# Patient Record
Sex: MALE | Race: WHITE | NOT HISPANIC OR LATINO | Employment: FULL TIME | URBAN - METROPOLITAN AREA
[De-identification: names, ages, dates, MRNs, and addresses within clinical notes are randomized per-mention and may not be internally consistent; named-entity substitution may affect disease eponyms.]

---

## 2017-01-30 ENCOUNTER — TRANSFERRED RECORDS (OUTPATIENT)
Dept: HEALTH INFORMATION MANAGEMENT | Facility: CLINIC | Age: 57
End: 2017-01-30

## 2017-03-23 ENCOUNTER — OFFICE VISIT (OUTPATIENT)
Dept: DERMATOLOGY | Facility: CLINIC | Age: 57
End: 2017-03-23

## 2017-03-23 DIAGNOSIS — L82.1 SEBORRHEIC KERATOSIS: Primary | ICD-10-CM

## 2017-03-23 PROCEDURE — 99212 OFFICE O/P EST SF 10 MIN: CPT | Performed by: DERMATOLOGY

## 2017-03-23 NOTE — LETTER
3/23/2017       RE: Deacon Torres  [address]  [place] [address]     Dear Colleague,    Thank you for referring your patient, Deacon Torres, to the [facility] at [facility]. Please see a copy of my visit note below.    [facility] Dermatology Note      Dermatology Problem List:  1. Compound nevus with moderate dysplasia, right lateral ankle  -s/p biopsy 7/18/2016  2. Actinic keratosis  -s/p cryotherapy    Encounter Date: Mar 23, 2017    CC:  Chief Complaint   Patient presents with     RECHECK     Skin lesion         History of Present Illness:  Mr. Deacon Torres is a 56 year old male who presents as follow up for lesion on Scalp. The patient was last seen 10/18/2016 when AKs were treated with cryotherapy. Today, the pt reports about 2 months ago, he developed a new lesion that does not burn, itch, or spontaneously bleed.Lesion has not been growing in size but it is noticeable. No other skin concerns at this time.     Past Medical History:   There is no problem list on file for this patient.    Past Medical History:   Diagnosis Date     Carpal tunnel syndrome      Depression      No past surgical history on file.    Social History:  Reviewed and unchanged but kept in chart for clinician convenience  The patient works as a paramedic. Worked construction prior. 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 The patient has 1-2 alcoholic drinks per week, does not smoke or use tobacco.     Family History:  Reviewed and unchanged but kept in chart for clinician convenience  There is no family history of skin cancer.    Medications:  Current Outpatient Prescriptions   Medication Sig Dispense Refill     SERTRALINE HCL PO Take 100 mg by mouth daily         Allergies   Allergen Reactions     Codeine Itching       Review of Systems:  -Skin: As above in HPI. No additional skin concerns.    Physical exam:  Vitals: There were no vitals taken for  this visit.  GEN: This is a well developed, well-nourished male in no acute distress, in a pleasant mood.    SKIN:  Focused examination of the Head/Scalp was performed.  -There is a waxy stuck on tan to brown papule on the scalp.  -No other lesions of concern on areas examined.     Impression/Plan:  1. Seborrheic keratosis: Benign nature discussed. Explained that treatment covered by insurance if inflamed, itchy, oozy, traumatized, or peeling off. Otherwise treatment can be done with a cosmetic out of pocket fee. Pt elected to not treat for now.    Follow up as scheduled with Dr. Vogt in May 2017, earlier for new or changing lesions.     Staff Involved:  Scribe/Staff      Scribe Disclosure:   I, Alex Ash, am serving as a scribe to document services personally performed by Dr. Travis Alvarenga, based on data collection and the provider's statements to me.     Provider Disclosure:   I have reviewed the documentation recorded by the scribe and have edited it as needed. I have personally performed the services documented here and the documentation accurately represents those services and the decisions made by me.     Travis Alvarenga MD, MS    Department of Dermatology  [facility]: Phone: [phone], Fax:[phone]  [facility]: Phone: [phone], Fax: [phone]

## 2017-03-23 NOTE — PROGRESS NOTES
[facility] Dermatology Note      Dermatology Problem List:  1. Compound nevus with moderate dysplasia, right lateral ankle  -s/p biopsy 7/18/2016  2. Actinic keratosis  -s/p cryotherapy    Encounter Date: Mar 23, 2017    CC:  Chief Complaint   Patient presents with     RECHECK     Skin lesion         History of Present Illness:  Mr. Deacon Torres is a 56 year old male who presents as follow up for lesion on Scalp. The patient was last seen 10/18/2016 when AKs were treated with cryotherapy. Today, the pt reports about 2 months ago, he developed a new lesion that does not burn, itch, or spontaneously bleed.Lesion has not been growing in size but it is noticeable. No other skin concerns at this time.     Past Medical History:   There is no problem list on file for this patient.    Past Medical History:   Diagnosis Date     Carpal tunnel syndrome      Depression      No past surgical history on file.    Social History:  Reviewed and unchanged but kept in chart for clinician convenience  The patient works as a paramedic. Worked construction prior. 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 The patient has 1-2 alcoholic drinks per week, does not smoke or use tobacco.     Family History:  Reviewed and unchanged but kept in chart for clinician convenience  There is no family history of skin cancer.    Medications:  Current Outpatient Prescriptions   Medication Sig Dispense Refill     SERTRALINE HCL PO Take 100 mg by mouth daily         Allergies   Allergen Reactions     Codeine Itching       Review of Systems:  -Skin: As above in HPI. No additional skin concerns.    Physical exam:  Vitals: There were no vitals taken for this visit.  GEN: This is a well developed, well-nourished male in no acute distress, in a pleasant mood.    SKIN:  Focused examination of the Head/Scalp was performed.  -There is a waxy stuck on tan to brown papule on the scalp.  -No other lesions of concern on areas examined.      Impression/Plan:  1. Seborrheic keratosis: Benign nature discussed. Explained that treatment covered by insurance if inflamed, itchy, oozy, traumatized, or peeling off. Otherwise treatment can be done with a cosmetic out of pocket fee. Pt elected to not treat for now.    Follow up as scheduled with Dr. Vogt in May 2017, earlier for new or changing lesions.     Staff Involved:  Scribe/Staff      Scribe Disclosure:   I, Alex Ash, am serving as a scribe to document services personally performed by Dr. Travis Alvarenga, based on data collection and the provider's statements to me.     Provider Disclosure:   I have reviewed the documentation recorded by the scribe and have edited it as needed. I have personally performed the services documented here and the documentation accurately represents those services and the decisions made by me.     Travis Alvarenga MD, MS    Department of Dermatology  [facility]: Phone: [phone], Fax:[phone]  [facility]: Phone: [phone], Fax: [phone]

## 2017-03-23 NOTE — MR AVS SNAPSHOT
After Visit Summary   3/23/2017    Deacon Torres    MRN: [id number]           Patient Information     Date Of Birth          1960        Visit Information        Provider Department      3/23/2017 8:15 AM Travis Alvarenga MD [facility]        Today's Diagnoses     Seborrheic keratosis    -  1       Follow-ups after your visit        Your next 10 appointments already scheduled     May 05, 2017  8:30 AM CDT   Return Visit with Barbara Vogt MD   [facility] ([facility])    [address] [place] [address] [phone]              Who to contact     If you have questions or need follow up information about today's clinic visit or your schedule please contact [facility] directly at [phone].  Normal or non-critical lab and imaging results will be communicated to you by MyChart, letter or phone within 4 business days after the clinic has received the results. If you do not hear from us within 7 days, please contact the clinic through MyChart or phone. If you have a critical or abnormal lab result, we will notify you by phone as soon as possible.  Submit refill requests through [employer] or call your pharmacy and they will forward the refill request to us. Please allow 3 business days for your refill to be completed.          Additional Information About Your Visit        MyChart Information     [employer] is an electronic gateway that provides easy, online access to your medical records. With [employer], you can request a clinic appointment, read your test results, renew a prescription or communicate with your care team.     To sign up for [employer] visit the website at [email or web address]   You will be asked to enter the access code listed below, as well as some personal information. Please follow the directions to create your username and password.     Your access code is: [email or web address]  Expires: 6/21/2017   8:30 AM     Your access code will  in 90 days. If you need help or a new code, please contact your [facility] or call [phone] for assistance.        Care EveryWhere ID     This is your Care EveryWhere ID. This could be used by other organizations to access your [place] medical records  [id number]         Blood Pressure from Last 3 Encounters:   No data found for BP    Weight from Last 3 Encounters:   No data found for Wt              Today, you had the following     No orders found for display       Primary Care Provider Office Phone # Fax #    Alexi Watts [phone]       [facility] [address] [address]        Thank you!     Thank you for choosing [facility]  for your care. Our goal is always to provide you with excellent care. Hearing back from our patients is one way we can continue to improve our services. Please take a few minutes to complete the written survey that you may receive in the mail after your visit with us. Thank you!             Your Updated Medication List - Protect others around you: Learn how to safely use, store and throw away your medicines at www.disposemymeds.org.          This list is accurate as of: 3/23/17  8:30 AM.  Always use your most recent med list.                   Brand Name Dispense Instructions for use    SERTRALINE HCL PO      Take 100 mg by mouth daily

## 2017-03-23 NOTE — NURSING NOTE
Dermatology Rooming Note    Deacon Torres's goals for this visit include:   Chief Complaint   Patient presents with     RECHECK     Skin lesion       Is a scribe okay for this visit:YES,     Are records needed for this visit(If yes, obtain release of information): Not applicable,      Vitals: There were no vitals taken for this visit.    Referring Provider:  No referring provider defined for this encounter.        Medication Refills: none      Shea Lozano CMA

## 2017-05-05 ENCOUNTER — OFFICE VISIT (OUTPATIENT)
Dept: DERMATOLOGY | Facility: CLINIC | Age: 57
End: 2017-05-05
Payer: COMMERCIAL

## 2017-05-05 DIAGNOSIS — L57.0 ACTINIC KERATOSIS: ICD-10-CM

## 2017-05-05 DIAGNOSIS — D48.5 NEOPLASM OF UNCERTAIN BEHAVIOR OF SKIN: ICD-10-CM

## 2017-05-05 DIAGNOSIS — Z86.018 HISTORY OF DYSPLASTIC NEVUS: Primary | ICD-10-CM

## 2017-05-05 PROCEDURE — 11101 HC DESTRUCT PREMALIGNANT LESION, FIRST: CPT | Performed by: DERMATOLOGY

## 2017-05-05 PROCEDURE — 11100 HC BIOPSY SKIN/SUBQ/MUC MEM, SINGLE LESION: CPT | Mod: 59 | Performed by: DERMATOLOGY

## 2017-05-05 PROCEDURE — 99213 OFFICE O/P EST LOW 20 MIN: CPT | Mod: 25 | Performed by: DERMATOLOGY

## 2017-05-05 PROCEDURE — 17003 DESTRUCT PREMALG LES 2-14: CPT | Performed by: DERMATOLOGY

## 2017-05-05 PROCEDURE — 17000 DESTRUCT PREMALG LESION: CPT | Performed by: DERMATOLOGY

## 2017-05-05 PROCEDURE — 88305 TISSUE EXAM BY PATHOLOGIST: CPT | Performed by: DERMATOLOGY

## 2017-05-05 NOTE — MR AVS SNAPSHOT
After Visit Summary   5/5/2017    Deacon Torres    MRN: [id number]           Patient Information     Date Of Birth          1960        Visit Information        Provider Department      5/5/2017 8:30 AM Barbara Vogt MD [facility]        Today's Diagnoses     History of dysplastic nevus    -  1    Neoplasm of uncertain behavior of skin        Actinic keratosis          Care Instructions    Cryotherapy    What is it?    Use of a very cold liquid, such as liquid nitrogen, to freeze and destroy abnormal skin cells that need to be removed    What should I expect?    Tenderness and redness    A small blister that might grow and fill with dark purple blood. There may be crusting.    More than one treatment may be needed if the lesions do not go away.    How do I care for the treated area?    Gently wash the area with your hands when bathing.    Use a thin layer of Vaseline to help with healing. You may use a Band-Aid.     The area should heal within 7-10 days and may leave behind a pink or lighter color.     Do not use an antibiotic or Neosporin ointment.     You may take acetaminophen (Tylenol) for pain.     Call your Doctor if you have:    Severe pain    Signs of infection (warmth, redness, cloudy yellow drainage, and or a bad smell)    Questions or concerns    Who should I call with questions?       [facility]: [phone]       [facility]: [phone]       For urgent needs outside of business hours call the [facility] at [phone]        and ask for the dermatology resident on call    Wound Care After a Biopsy    What is a skin biopsy?  A skin biopsy allows the doctor to examine a very small piece of tissue under the microscope to determine the diagnosis and the best treatment for the skin condition. A local anesthetic (numbing medicine)  is injected with a very small needle into the skin area to be  tested. A small piece of skin is taken from the area. Sometimes a suture (stitch) is used.     What are the risks of a skin biopsy?  I will experience scar, bleeding, swelling, pain, crusting and redness. I may experience incomplete removal or recurrence. Risks of this procedure are excessive bleeding, bruising, infection, nerve damage, numbness, thick (hypertrophic or keloidal) scar and non-diagnostic biopsy.    How should I care for my wound for the first 24 hours?    Keep the wound dry and covered for 24 hours    If it bleeds, hold direct pressure on the area for 15 minutes. If bleeding does not stop then go to the emergency room    Avoid strenuous exercise the first 1-2 days or as your doctor instructs you    How should I care for the wound after 24 hours?    After 24 hours, remove the bandage    You may bathe or shower as normal    If you had a scalp biopsy, you can shampoo as usual and can use shower water to clean the biopsy site daily    Clean the wound twice a day with gentle soap and water    Do not scrub, be gentle    Apply white petroleum/Vaseline after cleaning the wound with a cotton swab or a clean finger, and keep the site covered with a Bandaid /bandage. Bandages are not necessary with a scalp biopsy    If you are unable to cover the site with a Bandaid /bandage, re-apply ointment 2-3 times a day to keep the site moist. Moisture will help with healing    Avoid strenuous activity for first 1-2 days    Avoid lakes, rivers, pools, and oceans until the stitches are removed or the site is healed    How do I clean my wound?    Wash hands for 15 minutes with soap or use hand  before all wound care    Clean the wound with gentle soap and water    Apply white petroleum/Vaseline  to wound after it is clean    Replace the Bandaid /bandage to keep the wound covered for the first few days or as instructed by your doctor    If you had a scalp biopsy, warm shower water to the area on a daily basis should  suffice    What should I use to clean my wound?     Cotton-tipped applicators (Qtips )    White petroleum jelly (Vaseline ). Use a clean new container and use Q-tips to apply.    Bandaids   as needed    Gentle soap     How should I care for my wound long term?    Do not get your wound dirty    Keep up with wound care for one week or until the area is healed.    A small scab will form and fall off by itself when the area is completely healed. The area will be red and will become pink in color as it heals. Sun protection is very important for how your scar will turn out. Sunscreen with an SPF 30 or greater is recommended once the area is healed.    If you have stitches, stitches need to be removed in 5-7 days. You may return to our clinic for this or you may have it done locally at your doctor s office.    You should have some soreness but it should be mild and slowly go away over several days. Talk to your doctor about using tylenol for pain,    When should I call my doctor?  If you have increased:     Pain or swelling    Pus or drainage (clear or slightly yellow drainage is ok)    Temperature over 100F    Spreading redness or warmth around wound    When will I hear about my results?  The biopsy results can take 2-3 weeks to come back. The clinic will call you with the results, send you a Enlightedt message, or have you schedule a follow-up clinic or phone time to discuss the results. Contact our clinics if you do not hear from us in 3 weeks.     Who should I call with questions?    [facility]: [phone]     [facility]: [phone]    For urgent needs outside of business hours call the [facility] at [phone] and ask for the dermatology resident on call        [place] Locations for Suture Removal    Refer to your after visit summary for reference on when to have sutures removed.   You may have sutures removed at this clinic or most convenient  location to schedule nursing visit for removal.     [place]   [address]  [place] [address]  [phone]   [place]  [address]  [place] [address]  [phone]     [place]  [address]  [place] [address]  [phone] [place]  [address]  [place] [address]  [phone]   [place]   [address]  [place] [address]  [phone]   [place]  [address] [place] [address]  [phone]     [place]  [address]  [place] [address]  [phone]   [place]  [address]  [facility] [address]  [phone]   [place]  [address]  [facility] [address]  [phone]   [place]   [address]  [place] [address]  [phone]     [place]  [address]  [facility] [address]  [phone] [place]  [address]  [place] [address]  [phone]   [place]  [address]  [place] [address]  [phone]   [place]  [address]  [place] [address]  [phone]     [place]  [address]  [facility] [address]  [phone]   [place]  [address]  [place] [address]  [phone]   [facility]  [address] [facility] [address]  [phone]     [place]  [address]  [place] [address] [phone] [place], [facility]  [address]  [facility] [address]  [phone] [place]  [address]  [place] [address] [phone]   [facility]  [address]  [facility] [address]  [phone]     [place]  [address] [address]  [phone] [place], [facility]?  [address] [place] [address]  [phone]   [place]  [address] [place] [address]  [phone]   [place] [address] [place] [address]  [phone]   [place]  [address] [place] [address]  [phone]     [address] [address]  [phone]  [place]  [address]  [place] [address]  [phone]   [place]   [address]  [facility] [address]  [phone]     [place]  [address] [place] [address]  [phone]   [place]  [address] [facility] [address]  [phone]   [place]  [address] [place] [address]  [phone]   [place]  [address]  [facility] [address]  [phone]     [place]  [address]  [facility] [address]  [phone]   [place]  [address]  hospitals [address]  [phone]    [place]  [address]  [facility] [address]  [phone]   [place]  [address]  [facility] [address]  [phone]   [address] [place] [address]  [phone]                Follow-ups after your visit        Who to contact     If you have questions or need follow up information about today's clinic visit or your schedule please contact [facility] directly at [phone].  Normal or non-critical lab and imaging results will be communicated to you by [employer], letter or phone within 4 business days after the clinic has received the results. If you do not hear from us within 7 days, please contact the clinic through [employer] or phone. If you have a critical or abnormal lab result, we will notify you by phone as soon as possible.  Submit refill requests through [employer] or call your pharmacy and they will forward the refill request to us. Please allow 3 business days for your refill to be completed.          Additional Information About Your Visit        [employer] Information     [employer] is an electronic gateway that provides easy, online access to your medical records. With [employer], you can request a clinic appointment, read your test results, renew a prescription or communicate with your care team.     To sign up for [employer] visit the website at [email or web address]   You will be asked to enter the access code listed below,  as well as some personal information. Please follow the directions to create your username and password.     Your access code is: [email or web address]  Expires: 2017  8:30 AM     Your access code will  in 90 days. If you need help or a new code, please contact your [facility] or call [phone] for assistance.        Care EveryWhere ID     This is your Care EveryWhere ID. This could be used by other organizations to access your [place] medical records  [id number]         Blood Pressure from Last 3 Encounters:   No data found for BP    Weight from Last 3 Encounters:   No data found for Wt              We Performed the Following     BIOPSY SKIN/SUBQ/MUC MEM, EACH ADDTL LESION     BIOPSY SKIN/SUBQ/MUC MEM, SINGLE LESION     DESTRUCT PREMALIGNANT LESION, 2-14     DESTRUCT PREMALIGNANT LESION, FIRST     Surgical pathology exam        Primary Care Provider Office Phone # Fax #    Alexi Watts [phone]       [address] [address]        Thank you!     Thank you for choosing [facility]  for your care. Our goal is always to provide you with excellent care. Hearing back from our patients is one way we can continue to improve our services. Please take a few minutes to complete the written survey that you may receive in the mail after your visit with us. Thank you!             Your Updated Medication List - Protect others around you: Learn how to safely use, store and throw away your medicines at www.disposemymeds.org.          This list is accurate as of: 17  9:13 AM.  Always use your most recent med list.                   Brand Name Dispense Instructions for use    SERTRALINE HCL PO      Take 100 mg by mouth daily

## 2017-05-05 NOTE — NURSING NOTE
Dermatology Rooming Note    Deacon Torres's goals for this visit include:   Chief Complaint   Patient presents with     Derm Problem     6 month skin check hx of AK       Is a scribe okay for this visit:YES    Are records needed for this visit(If yes, obtain release of information): Not applicable     Vitals: There were no vitals taken for this visit.    Referring Provider:  No referring provider defined for this encounter.

## 2017-05-05 NOTE — LETTER
5/5/2017       RE: Deacon Torres  [address]  [facility] [address]     Dear Colleague,    Thank you for referring your patient, Deacon Torres, to the [facility] at [facility]. Please see a copy of my visit note below.    [facility] Dermatology Note      Dermatology Problem List:  1. Compound nevus with moderate dysplasia, right lateral ankle  -s/p biopsy 7/18/2016  2. Actinic keratosis  -s/p cryotherapy    Encounter Date: May 5, 2017    CC:  Chief Complaint   Patient presents with     Derm Problem     6 month skin check hx of AK         History of Present Illness:  Mr. Deacon Torres is a 56 year old male who presents as follow up for history of dysplastic nevus and AK. The patient was last seen 3/23/2017 by Dr. Alvarenga when SKs were identified, patient declined treatment. Today, the patient reports that he has had the lesion on his right forehead for many years. Denies any lesions bleeding, crusting or changing. Scalp spot has decreased in size. The patient reports no other lesions of concern.      Past Medical History:   There is no problem list on file for this patient.    Past Medical History:   Diagnosis Date     Carpal tunnel syndrome      Depression      No past surgical history on file.    Social History:  Reviewed and unchanged but kept in chart for clinician convenience  The patient works as a paramedic. Worked construction prior. 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 The patient has 1-2 alcoholic drinks per week, does not smoke or use tobacco.     Family History:  Reviewed and unchanged but kept in chart for clinician convenience  There is no family history of skin cancer.    Medications:  Current Outpatient Prescriptions   Medication Sig Dispense Refill     SERTRALINE HCL PO Take 100 mg by mouth daily       Allergies   Allergen Reactions     Codeine Itching     Review of Systems:  -Const: Denies fevers or chills. Admits  to night sweats, he has followed with his PCP for this symptom.   -Skin: As above in HPI. No additional skin concerns.    Physical exam:  Vitals: There were no vitals taken for this visit.  GEN: This is a well developed, well-nourished male in no acute distress, in a pleasant mood.    SKIN: Total skin excluding the undergarment areas was performed. The exam included the head/face, neck, both arms, chest, back, abdomen, both legs, digits and/or nails. Declines genital exam.   -There is a well healed surgical scar without erythema, nodularity or telangiectasias on the right lateral ankle.   -There are erythematous macules with overyling adherent scale on the right cheek, right temple and left lateral brow.   -There is a waxy stuck on tan to brown papule on the left vertex scalp with erythema.  -Back is normal.   -Tan 2cm patch on the left cheek.   -5mm verrucous stuck on papule, left vertex scalp  -No other lesions of concern on areas examined.     Impression/Plan:  1. History of dysplastic nevus, no clinical evidence of recurrence    ABCDs of melanoma were discussed and self skin checks were advised.   2. Actinic keratosis, right cheek, right temple, left lateral brow    Cryotherapy procedure note: After verbal consent and discussion of risks and benefits including but no limited to dyspigmentation/scar, blister, and pain, 3 were treated with 1-2mm freeze border for 1 cycle with liquid nitrogen. Post cryotherapy instructions were provided.    Sun precaution was advised including the use of sun screens of SPF 30 or higher, sun protective clothing, and avoidance of tanning beds.  3. 5mm verrucous papule, left vertex scalp. Neoplasm of uncertain behavior. Differential diagnosis includes SK versus other    Shave biopsy:  After discussion of benefits and risks including but not limited to bleeding/bruising, pain/swelling, infection, scar, incomplete removal, nerve damage/numbness, recurrence, and non-diagnostic biopsy,  written consent, verbal consent and photographs were obtained. Time-out was performed. The area was cleaned with isopropyl alcohol. <.5mL of 1% lidocaine with epinephrine was injected to obtain adequate anesthesia of the lesion on the left vertex scalp. A shave biopsy was performed. Hemostasis was achieved with aluminium chloride. Vaseline and a sterile dressing were applied. The patient tolerated the procedure and no complications were noted. The patient was provided with verbal and written post care instructions.   4. Tan 2cm patch, left cheek. Neoplasm of uncertain behavior. Differential diagnosis includes lentigo versus other, changed compared to prior photo    Punch biopsy:  After discussion of benefits and risks including but not limited to bleeding/bruising, pain/swelling, infection, scar, incomplete removal, nerve damage/numbness, recurrence, and non-diagnostic biopsy, written consent, verbal consent and photographs were obtained. Time-out was performed. The area was cleaned with isopropyl alcohol. 0.5 mL of 1% lidocaine with epinephrine was injected to obtain adequate anesthesia of the lesion on the left cheek. A 2 mm punch biopsy was performed.  6-0 prolene sutures were utilized to approximate the epidermal edges.  White petroleum jelly/VaselineTM and a bandage was applied to the wound.  Explicit verbal and written wound care instructions were provided.  The patient left the Dermatology Clinic in good condition. The patient was counseled to follow up for suture removal in approximately 5-7 days.    Follow up in 1 year, earlier for new or changing lesions.     Staff Involved:  Scribe/Staff    Scribe Disclosure:   I, Saima Mead, am serving as a scribe to document services personally performed by Dr. Barbara Vogt, based on data collection and the provider's statements to me.     Provider Disclosure:   I agree with above History, Review of Systems, Physical exam and Plan. I have reviewed the content of the  documentation and have edited it as needed. I have personally performed the services documented here and the documentation accurately represents those services and the decisions I have made.     Barbara Vogt MD    Department of Dermatology  [facility]: Phone: [phone], Fax:[phone]  [facility]: Phone: [phone], Fax: [phone]

## 2017-05-05 NOTE — PATIENT INSTRUCTIONS
Cryotherapy    What is it?    Use of a very cold liquid, such as liquid nitrogen, to freeze and destroy abnormal skin cells that need to be removed    What should I expect?    Tenderness and redness    A small blister that might grow and fill with dark purple blood. There may be crusting.    More than one treatment may be needed if the lesions do not go away.    How do I care for the treated area?    Gently wash the area with your hands when bathing.    Use a thin layer of Vaseline to help with healing. You may use a Band-Aid.     The area should heal within 7-10 days and may leave behind a pink or lighter color.     Do not use an antibiotic or Neosporin ointment.     You may take acetaminophen (Tylenol) for pain.     Call your Doctor if you have:    Severe pain    Signs of infection (warmth, redness, cloudy yellow drainage, and or a bad smell)    Questions or concerns    Who should I call with questions?       [facility]: [phone]       [facility]: [phone]       For urgent needs outside of business hours call the [facility] at [phone]        and ask for the dermatology resident on call    Wound Care After a Biopsy    What is a skin biopsy?  A skin biopsy allows the doctor to examine a very small piece of tissue under the microscope to determine the diagnosis and the best treatment for the skin condition. A local anesthetic (numbing medicine)  is injected with a very small needle into the skin area to be tested. A small piece of skin is taken from the area. Sometimes a suture (stitch) is used.     What are the risks of a skin biopsy?  I will experience scar, bleeding, swelling, pain, crusting and redness. I may experience incomplete removal or recurrence. Risks of this procedure are excessive bleeding, bruising, infection, nerve damage, numbness, thick (hypertrophic or keloidal) scar and non-diagnostic biopsy.    How should I care  for my wound for the first 24 hours?    Keep the wound dry and covered for 24 hours    If it bleeds, hold direct pressure on the area for 15 minutes. If bleeding does not stop then go to the emergency room    Avoid strenuous exercise the first 1-2 days or as your doctor instructs you    How should I care for the wound after 24 hours?    After 24 hours, remove the bandage    You may bathe or shower as normal    If you had a scalp biopsy, you can shampoo as usual and can use shower water to clean the biopsy site daily    Clean the wound twice a day with gentle soap and water    Do not scrub, be gentle    Apply white petroleum/Vaseline after cleaning the wound with a cotton swab or a clean finger, and keep the site covered with a Bandaid /bandage. Bandages are not necessary with a scalp biopsy    If you are unable to cover the site with a Bandaid /bandage, re-apply ointment 2-3 times a day to keep the site moist. Moisture will help with healing    Avoid strenuous activity for first 1-2 days    Avoid lakes, rivers, pools, and oceans until the stitches are removed or the site is healed    How do I clean my wound?    Wash hands for 15 minutes with soap or use hand  before all wound care    Clean the wound with gentle soap and water    Apply white petroleum/Vaseline  to wound after it is clean    Replace the Bandaid /bandage to keep the wound covered for the first few days or as instructed by your doctor    If you had a scalp biopsy, warm shower water to the area on a daily basis should suffice    What should I use to clean my wound?     Cotton-tipped applicators (Qtips )    White petroleum jelly (Vaseline ). Use a clean new container and use Q-tips to apply.    Bandaids   as needed    Gentle soap     How should I care for my wound long term?    Do not get your wound dirty    Keep up with wound care for one week or until the area is healed.    A small scab will form and fall off by itself when the area is  completely healed. The area will be red and will become pink in color as it heals. Sun protection is very important for how your scar will turn out. Sunscreen with an SPF 30 or greater is recommended once the area is healed.    If you have stitches, stitches need to be removed in 5-7 days. You may return to our clinic for this or you may have it done locally at your doctor s office.    You should have some soreness but it should be mild and slowly go away over several days. Talk to your doctor about using tylenol for pain,    When should I call my doctor?  If you have increased:     Pain or swelling    Pus or drainage (clear or slightly yellow drainage is ok)    Temperature over 100F    Spreading redness or warmth around wound    When will I hear about my results?  The biopsy results can take 2-3 weeks to come back. The clinic will call you with the results, send you a [employer] message, or have you schedule a follow-up clinic or phone time to discuss the results. Contact our clinics if you do not hear from us in 3 weeks.     Who should I call with questions?    [facility]: [phone]     [facility]: [phone]    For urgent needs outside of business hours call the [facility] at [phone] and ask for the dermatology resident on call        [place] Locations for Suture Removal    Refer to your after visit summary for reference on when to have sutures removed.   You may have sutures removed at this clinic or most convenient location to schedule nursing visit for removal.     [place]   [address] [facility] [id number] [phone]   [place]  [address]  [facility] [address]  [phone]     [place]  [address] [facility] [address]  [phone] [place]  [address] [facility] [address] [phone]   [place]   [address] [facility] [id number] [phone]   [place]  [address] [place] [address]  [phone]     [place]  [address]  [place] [address]  [phone]   [place]  [address]  [facility] [address]  [phone]   [place]  [address]  [place] [address]  [phone]   [place]   [address]  [place] [address]  [phone]     [place]  [address]  [place] [address]  [phone] [place]  [address]  [place] [address]  [phone]   [place]  [address]  [place] [address]  [phone]   [place]  [address]  [place] [address]  [phone]     [facility]  [address]  [facility] [address]  [phone]   [place]  [address]  [place] [address]  [phone]   [facility]  [address]  [facility] [address]  [phone]     [place]  [address]  [place] [address]  [phone] [facility]  [address]  [facility] [address]  [phone] [place]  [address]  [facility] [address]  [phone]   [facility]  [address]  [facility] [address]  [phone]     [place]  [address]  [facility] [address]  [phone] [place], [place]?  [address] [facility] [place] [id number] [phone]   [place]  [address]  [place] [address]  [phone]   [place]  [address]  [facility] [address]  [phone]   [place]  [address]  [place] [address]  [phone]     [place]  [address]  [facility] [address]  [phone] [place]  [address]  [place] [address]  [phone]   [place]   [address]  [facility] [address]  [phone]     [place]  [address] [place] [address]  [phone]   [place]  [address] [place] [address]  [phone]   [place]  [address] [place] [address]  [phone]   [place] [address]  [facility] [address]  [phone]     [place]  [address]  [facility] [address]  [phone]   [place]  [address]  [facility] [address]  [phone]    [place]  [address]  [facility] [address]  [phone]   [place]  [address]  [facility] [address]  [phone]   [address]   [facility] [address] [phone]

## 2017-05-10 LAB — COPATH REPORT: NORMAL

## 2017-06-15 ENCOUNTER — TRANSFERRED RECORDS (OUTPATIENT)
Dept: HEALTH INFORMATION MANAGEMENT | Facility: CLINIC | Age: 57
End: 2017-06-15

## 2017-08-14 ENCOUNTER — OFFICE VISIT (OUTPATIENT)
Dept: DERMATOLOGY | Facility: CLINIC | Age: 57
End: 2017-08-14
Payer: COMMERCIAL

## 2017-08-14 DIAGNOSIS — L57.0 ACTINIC KERATOSIS: Primary | ICD-10-CM

## 2017-08-14 DIAGNOSIS — L21.9 SEBORRHEIC DERMATITIS: ICD-10-CM

## 2017-08-14 DIAGNOSIS — B07.9 FILIFORM WART: ICD-10-CM

## 2017-08-14 PROCEDURE — 99213 OFFICE O/P EST LOW 20 MIN: CPT | Mod: 25 | Performed by: DERMATOLOGY

## 2017-08-14 PROCEDURE — 17110 DESTRUCTION B9 LES UP TO 14: CPT | Performed by: DERMATOLOGY

## 2017-08-14 PROCEDURE — 17000 DESTRUCT PREMALG LESION: CPT | Mod: 59 | Performed by: DERMATOLOGY

## 2017-08-14 PROCEDURE — 17003 DESTRUCT PREMALG LES 2-14: CPT | Performed by: DERMATOLOGY

## 2017-08-14 RX ORDER — HYDROCORTISONE 2.5 %
CREAM (GRAM) TOPICAL
Qty: 30 G | Refills: 3 | Status: SHIPPED | OUTPATIENT
Start: 2017-08-14 | End: 2021-04-09

## 2017-08-14 RX ORDER — KETOCONAZOLE 20 MG/G
CREAM TOPICAL
Qty: 30 G | Refills: 3 | Status: SHIPPED | OUTPATIENT
Start: 2017-08-14 | End: 2021-04-09

## 2017-08-14 NOTE — MR AVS SNAPSHOT
After Visit Summary   2017    Deacon Torres    MRN: [id number]           Patient Information     Date Of Birth          1960        Visit Information        Provider Department      2017 8:45 AM Jesi Rocha MD [facility]        Today's Diagnoses     Actinic keratosis    -  1    Seborrheic dermatitis        Filiform wart           Follow-ups after your visit        Who to contact     If you have questions or need follow up information about today's clinic visit or your schedule please contact [facility] directly at [phone].  Normal or non-critical lab and imaging results will be communicated to you by MyChart, letter or phone within 4 business days after the clinic has received the results. If you do not hear from us within 7 days, please contact the clinic through MyChart or phone. If you have a critical or abnormal lab result, we will notify you by phone as soon as possible.  Submit refill requests through [employer] or call your pharmacy and they will forward the refill request to us. Please allow 3 business days for your refill to be completed.          Additional Information About Your Visit        MyChart Information     [employer] is an electronic gateway that provides easy, online access to your medical records. With [employer], you can request a clinic appointment, read your test results, renew a prescription or communicate with your care team.     To sign up for [employer] visit the website at [email or web address]   You will be asked to enter the access code listed below, as well as some personal information. Please follow the directions to create your username and password.     Your access code is: [email or web address]  Expires: 2017  9:03 AM     Your access code will  in 90 days. If you need help or a new code, please contact your [facility] or call [phone] for assistance.        Care EveryWhere  ID     This is your Care EveryWhere ID. This could be used by other organizations to access your [place] medical records  [id number]         Blood Pressure from Last 3 Encounters:   No data found for BP    Weight from Last 3 Encounters:   No data found for Wt              Today, you had the following     No orders found for display       Primary Care Provider Office Phone # Fax #    Alexi Watts [phone]       [facility] [address]        Equal Access to Services     ROSANGELA ESCOBEDO : Hadii aad ku hadasho Soomaali, waaxda luqadaha, qaybta kaalmada adeegyada, waxay idiin hayaan adeeg kharash laeliana . So [facility] [phone].    ATENCIÓN: Si habla español, tiene a horton disposición servicios gratuitos de asistencia lingüística. [facility] [phone].    We comply with applicable federal civil rights laws and [place] laws. We do not discriminate on the basis of race, color, national origin, age, disability sex, sexual orientation or gender identity.            Thank you!     Thank you for choosing [facility]  for your care. Our goal is always to provide you with excellent care. Hearing back from our patients is one way we can continue to improve our services. Please take a few minutes to complete the written survey that you may receive in the mail after your visit with us. Thank you!             Your Updated Medication List - Protect others around you: Learn how to safely use, store and throw away your medicines at www.disposemymeds.org.          This list is accurate as of: 8/14/17  9:03 AM.  Always use your most recent med list.                   Brand Name Dispense Instructions for use Diagnosis    SERTRALINE HCL PO      Take 100 mg by mouth daily

## 2017-08-14 NOTE — PROGRESS NOTES
[facility] Dermatology Note    Dermatology Problem List:  1. Compound nevus with moderate dysplasia, right lateral ankle  -s/p biopsy 7/18/2016  2. Actinic keratosis  -s/p cryotherapy    Encounter Date: Aug 14, 2017    CC:  Chief Complaint   Patient presents with     RECHECK     3 month follow up - AK on L Cheek and Keratosis on scalp     History of Present Illness:  Mr. Deacon Torres is a 56 year old male with histor of dysplastic nevus presents as follow up for lesion on the left cheek. The patient was last seen 5/5/2017 by Dr. Vogt when 3 AKs were treated with cryotherapy and a biopsy was performed on the left vertex scalp and left cheek, which showed Sk and AK respectively. Today, the patient reports dryness and skin thickness on the facial folds. Also reports a raised lesion on the left upper cutaneous lip that he has shaved off in the past. It keeps growing back with finger like projections and he keeps shaving it off. The patient reports no other lesions of concern.    Past Medical History:   There is no problem list on file for this patient.    Past Medical History:   Diagnosis Date     Carpal tunnel syndrome      Depression      No past surgical history on file.    Social History:  The patient works as a paramedic. Worked construction prior. 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 The patient has 1-2 alcoholic drinks per week, does not smoke or use tobacco.     Family History:  There is no family history of skin cancer.    Medications:  Current Outpatient Prescriptions   Medication Sig Dispense Refill     SERTRALINE HCL PO Take 100 mg by mouth daily       Allergies   Allergen Reactions     Codeine Itching     Review of Systems:  -Skin: As above in HPI. No additional skin concerns.    Physical exam:  Vitals: There were no vitals taken for this visit.  GEN: This is a well developed, well-nourished male in no acute distress, in a pleasant mood.    SKIN: Focused examination of the face  and scalp was performed.  -There are erythematous macules with overyling adherent scale on the left cheek and left helix.   -Well healed biopsy scar on the left vertex scalp.   -Filiform verrucous papule on the left cheek.  -There is macular erythema of the scalp with nasolabial folds flaky white scale.  -No other lesions of concern on areas examined.     Impression/Plan:  1. History of dysplastic nevus, no clinical evidence of recurrence    Last total skin exam 5/5/217  2. Actinic keratosis, s/p biopsy, left cheek 5/5/2017, additionally on the left helix    Cryotherapy procedure note: After verbal consent and discussion of risks and benefits including but no limited to dyspigmentation/scar, blister, and pain, 4 were treated with 1-2mm freeze border for 1 cycle with liquid nitrogen. Post cryotherapy instructions were provided.  3. Filiform wart, left cheek    Discussed risk of spreading when shaving face and recommend disposable razor or placing razor in hot water after use.     Cryotherapy procedure note: After verbal consent and discussion of risks and benefits including but no limited to dyspigmentation/scar, blister, and pain, 1 was treated with 1-2mm freeze border for 2 cycles with liquid nitrogen. Post cryotherapy instructions were provided.   4. Seborrheic dermatitis, nasolabial folds    Start ketoconazole 2% cream mixed with hydrocortisone 2.5% cream. Apply twice daily for one week for flares.      Staff Involved:  Scribe/Staff    Scribe Disclosure:   ISaima, am serving as a scribe to document services personally performed by Dr. Jesi Rocha, based on data collection and the provider's statements to me.     I, Jesi Rocha MD, have reviewed the documentation recorded by the scribe and have edited it as needed. I have personally performed the services documented within the note, which accurately the services rendered and treatment plans formulated during the visit.    Jesi Rocha  MD  Dermatologist, [facility]  [address],  [facility] [address]

## 2017-08-14 NOTE — NURSING NOTE
Dermatology Rooming Note    Deacon Torres's goals for this visit include:   Chief Complaint   Patient presents with     RECHECK     3 month follow up - AK on L Cheek and Keratosis on scalp       Is a scribe okay for this visit: YES    Are records needed for this visit(If yes, obtain release of information): NO     Vitals: There were no vitals taken for this visit.    Referring Provider:  No referring provider defined for this encounter.    Joanna Cagle, CMA

## 2017-08-14 NOTE — PATIENT INSTRUCTIONS
Cryotherapy    What is it?    Use of a very cold liquid, such as liquid nitrogen, to freeze and destroy abnormal skin cells that need to be removed    What should I expect?    Tenderness and redness    A small blister that might grow and fill with dark purple blood. There may be crusting.    More than one treatment may be needed if the lesions do not go away.    How do I care for the treated area?    Gently wash the area with your hands when bathing.    Use a thin layer of Vaseline to help with healing. You may use a Band-Aid.     The area should heal within 7-10 days and may leave behind a pink or lighter color.     Do not use an antibiotic or Neosporin ointment.     You may take acetaminophen (Tylenol) for pain.     Call your Doctor if you have:    Severe pain    Signs of infection (warmth, redness, cloudy yellow drainage, and or a bad smell)    Questions or concerns    Who should I call with questions?       [facility]: [phone]       [facility]: [phone]       For urgent needs outside of business hours call the [facility] at [phone]        and ask for the dermatology resident on call

## 2017-11-13 ENCOUNTER — TRANSFERRED RECORDS (OUTPATIENT)
Dept: HEALTH INFORMATION MANAGEMENT | Facility: CLINIC | Age: 57
End: 2017-11-13

## 2017-11-14 NOTE — TELEPHONE ENCOUNTER
APPT INFO    Date /Time:  12/27/17 8:30AM   Reason for Appt: Suspected eustacian tube dysfuction, hearing loss/pressure   Ref Provider/Clinic: Self    Are there internal records? If yes, list:  none   Patient Contact (Y/N) & Call Details: Yes- patient transferred from CC rep   Action: Emailed MARLYN     OUTSIDE RECORDS CHECKLIST     CLINIC NAME COMMENTS REC (x) IMG (x)   [facility]  x x

## 2017-11-20 NOTE — TELEPHONE ENCOUNTER
Records Received From:  [facility]     DATE/EXAM/LOCATION  (specify location if different)   Office Notes:  1/30/17, 2/20/17, 4/10/17, 6/20/17, 7/17/17, 9/18/17, 10/30/17, 11/13/17   Radiology Reports: - CT sinuses 1/30/17, 2/20/17   Operative Notes: - septoplasty 6/15/17 with path    Audiogram:  11/13/17, 1/30/17   Missing: Images

## 2017-11-21 NOTE — PROGRESS NOTES
[facility] Dermatology Note      Dermatology Problem List:  1. Compound nevus with moderate dysplasia, right lateral ankle  -s/p biopsy 7/18/2016  2. Actinic keratosis  -s/p cryotherapy    Encounter Date: May 5, 2017    CC:  Chief Complaint   Patient presents with     Derm Problem     6 month skin check hx of AK         History of Present Illness:  Mr. Deacon Torres is a 56 year old male who presents as follow up for history of dysplastic nevus and AK. The patient was last seen 3/23/2017 by Dr. Alvarenga when SKs were identified, patient declined treatment. Today, the patient reports that he has had the lesion on his right forehead for many years. Denies any lesions bleeding, crusting or changing. Scalp spot has decreased in size. The patient reports no other lesions of concern.      Past Medical History:   There is no problem list on file for this patient.    Past Medical History:   Diagnosis Date     Carpal tunnel syndrome      Depression      No past surgical history on file.    Social History:  Reviewed and unchanged but kept in chart for clinician convenience  The patient works as a paramedic. Worked construction prior. 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 The patient has 1-2 alcoholic drinks per week, does not smoke or use tobacco.     Family History:  Reviewed and unchanged but kept in chart for clinician convenience  There is no family history of skin cancer.    Medications:  Current Outpatient Prescriptions   Medication Sig Dispense Refill     SERTRALINE HCL PO Take 100 mg by mouth daily       Allergies   Allergen Reactions     Codeine Itching     Review of Systems:  -Const: Denies fevers or chills. Admits to night sweats, he has followed with his PCP for this symptom.   -Skin: As above in HPI. No additional skin concerns.    Physical exam:  Vitals: There were no vitals taken for this visit.  GEN: This is a well developed, well-nourished male in no acute distress, in a pleasant mood.     SKIN: Total skin excluding the undergarment areas was performed. The exam included the head/face, neck, both arms, chest, back, abdomen, both legs, digits and/or nails. Declines genital exam.   -There is a well healed surgical scar without erythema, nodularity or telangiectasias on the right lateral ankle.   -There are erythematous macules with overyling adherent scale on the right cheek, right temple and left lateral brow.   -There is a waxy stuck on tan to brown papule on the left vertex scalp with erythema.  -Back is normal.   -Tan 2cm patch on the left cheek.   -5mm verrucous stuck on papule, left vertex scalp  -No other lesions of concern on areas examined.     Impression/Plan:  1. History of dysplastic nevus, no clinical evidence of recurrence    ABCDs of melanoma were discussed and self skin checks were advised.   2. Actinic keratosis, right cheek, right temple, left lateral brow    Cryotherapy procedure note: After verbal consent and discussion of risks and benefits including but no limited to dyspigmentation/scar, blister, and pain, 3 were treated with 1-2mm freeze border for 1 cycle with liquid nitrogen. Post cryotherapy instructions were provided.    Sun precaution was advised including the use of sun screens of SPF 30 or higher, sun protective clothing, and avoidance of tanning beds.  3. 5mm verrucous papule, left vertex scalp. Neoplasm of uncertain behavior. Differential diagnosis includes SK versus other    Shave biopsy:  After discussion of benefits and risks including but not limited to bleeding/bruising, pain/swelling, infection, scar, incomplete removal, nerve damage/numbness, recurrence, and non-diagnostic biopsy, written consent, verbal consent and photographs were obtained. Time-out was performed. The area was cleaned with isopropyl alcohol. <.5mL of 1% lidocaine with epinephrine was injected to obtain adequate anesthesia of the lesion on the left vertex scalp. A shave biopsy was performed.  Hemostasis was achieved with aluminium chloride. Vaseline and a sterile dressing were applied. The patient tolerated the procedure and no complications were noted. The patient was provided with verbal and written post care instructions.   4. Tan 2cm patch, left cheek. Neoplasm of uncertain behavior. Differential diagnosis includes lentigo versus other, changed compared to prior photo    Punch biopsy:  After discussion of benefits and risks including but not limited to bleeding/bruising, pain/swelling, infection, scar, incomplete removal, nerve damage/numbness, recurrence, and non-diagnostic biopsy, written consent, verbal consent and photographs were obtained. Time-out was performed. The area was cleaned with isopropyl alcohol. 0.5 mL of 1% lidocaine with epinephrine was injected to obtain adequate anesthesia of the lesion on the left cheek. A 2 mm punch biopsy was performed.  6-0 prolene sutures were utilized to approximate the epidermal edges.  White petroleum jelly/VaselineTM and a bandage was applied to the wound.  Explicit verbal and written wound care instructions were provided.  The patient left the Dermatology Clinic in good condition. The patient was counseled to follow up for suture removal in approximately 5-7 days.    Follow up in 1 year, earlier for new or changing lesions.     Staff Involved:  Scribe/Staff    Scribe Disclosure:   I, Saima Mead, am serving as a scribe to document services personally performed by Dr. Barbara Vogt, based on data collection and the provider's statements to me.     Provider Disclosure:   I agree with above History, Review of Systems, Physical exam and Plan. I have reviewed the content of the documentation and have edited it as needed. I have personally performed the services documented here and the documentation accurately represents those services and the decisions I have made.     Barbara Vogt MD    Department of Dermatology  [facility]  [facility]: Phone: [phone], Fax:[phone]  [facility]: Phone: [phone], Fax: [phone]       125

## 2017-12-11 ENCOUNTER — TRANSFERRED RECORDS (OUTPATIENT)
Dept: HEALTH INFORMATION MANAGEMENT | Facility: CLINIC | Age: 57
End: 2017-12-11

## 2017-12-21 DIAGNOSIS — H69.90 DYSFUNCTION OF EUSTACHIAN TUBE: Primary | ICD-10-CM

## 2017-12-22 NOTE — TELEPHONE ENCOUNTER
Received Imaging From: [facility]     Image Type (x): Disc:_x__  Pacs:___      Exam Date/Name: CT maxillofacial 2/20/17, 1/30/17 Comments: will put in film room

## 2017-12-27 ENCOUNTER — OFFICE VISIT (OUTPATIENT)
Dept: AUDIOLOGY | Facility: CLINIC | Age: 57
End: 2017-12-27
Payer: COMMERCIAL

## 2017-12-27 ENCOUNTER — PRE VISIT (OUTPATIENT)
Dept: OTOLARYNGOLOGY | Facility: CLINIC | Age: 57
End: 2017-12-27

## 2017-12-27 ENCOUNTER — OFFICE VISIT (OUTPATIENT)
Dept: OTOLARYNGOLOGY | Facility: CLINIC | Age: 57
End: 2017-12-27
Payer: COMMERCIAL

## 2017-12-27 VITALS — WEIGHT: 199 LBS | BODY MASS INDEX: 27.86 KG/M2 | HEIGHT: 71 IN

## 2017-12-27 DIAGNOSIS — H93.11 TINNITUS, RIGHT: ICD-10-CM

## 2017-12-27 DIAGNOSIS — H90.3 SENSORINEURAL HEARING LOSS (SNHL) OF BOTH EARS: Primary | ICD-10-CM

## 2017-12-27 DIAGNOSIS — H91.91 HEARING LOSS OF RIGHT EAR, UNSPECIFIED HEARING LOSS TYPE: Primary | ICD-10-CM

## 2017-12-27 ASSESSMENT — PAIN SCALES - GENERAL: PAINLEVEL: NO PAIN (0)

## 2017-12-27 NOTE — PROGRESS NOTES
AUDIOLOGY REPORT    SUMMARY: Audiology visit completed. See audiogram for results.      RECOMMENDATIONS: Follow-up with ENT.    Baltazar Chery.  Licensed Audiologist  [place] #[id number]

## 2017-12-27 NOTE — MR AVS SNAPSHOT
After Visit Summary   12/27/2017    Deacon Torres    MRN: [id number]           Patient Information     Date Of Birth          1960        Visit Information        Provider Department      12/27/2017 7:30 AM Emily Antunez AuD [facility] Audiology        Today's Diagnoses     Sensorineural hearing loss (SNHL) of both ears    -  1       Follow-ups after your visit        Your next 10 appointments already scheduled     Dec 29, 2017 10:30 AM CST   VEMP with Missael Quesada,  MISSAEL ABR MACHINE 1   [facility] Audiology ([facility])    [address] [place] [address]  4th [facility] [id number] [phone]           How is the test done?  First, we clean the skin. Then, we place sticky pathes (electrodes) on the forehead, side of the neck and under the eyes.  We place a foam plug in the ear canal that plays loud clicking sounds.  You then perform several tasks in response to the loud sounds, including: - Turning your head to one side and holding it there for one minute - Looking up with your eyes and holding the there for one minute  How do I prepare?  Do not take any sleeping pills, sedatives, or muscle relaxants that might keep you from being able to do the above tasks.  Please do not wear shirts with high collars or turtlenecks.  Men with neck hair should make sure the sides of their neck are shaved.  You should be able to return to your normal activites right after the testing.            Jan 08, 2018  5:40 PM CST   CT TEMPORAL ORBITAL SELLA W/O CONTRAST with UCCT2   [facility] Imaging [place] [facility] ([facility])    [address] [place] [address]  1st [facility] [id number] [phone]           Please bring any scans or X-rays taken at other hospitals, if similar tests were done. Also bring a list of your medicines, including vitamins, minerals and over-the-counter drugs. It is safest to leave personal items at home.  Be sure to tell your doctor:    If you have any allergies.   If there s any chance you are pregnant.   If you are breastfeeding.   If you have any special needs.  You do not need to do anything special to prepare.  Please wear loose clothing, such as a sweat suit or jogging clothes. Avoid snaps, zippers and other metal. We may ask you to undress and put on a hospital gown.            Jan 08, 2018  6:15 PM CST   (Arrive by 6:00 PM)   MR BRAIN W/O & W CONTRAST with [id number]   [facility] MRI ([facility] [place])    [address] [address] [phone]           Take your medicines as usual, unless your doctor tells you not to. Bring a list of your current medicines to your exam (including vitamins, minerals and over-the-counter drugs).  You will be given intravenous contrast for this exam. To prepare:   The day before your exam, drink extra fluids at least six 8-ounce glasses (unless your doctor tells you to restrict your fluids).   Have a blood test (creatinine test) within 30 days of your exam. Go to your clinic or Diagnostic Imaging Department for this test.  The MRI machine uses a strong magnet. Please wear clothes without metal (snaps, zippers). A sweatsuit works well, or we may give you a hospital gown.  Please remove any body piercings and hair extensions before you arrive. You will also remove watches, jewelry, hairpins, wallets, dentures, partial dental plates and hearing aids. You may wear contact lenses, and you may be able to wear your rings. We have a safe place to keep your personal items, but it is safer to leave them at home.   **IMPORTANT** THE INSTRUCTIONS BELOW ARE ONLY FOR THOSE PATIENTS WHO HAVE BEEN TOLD THEY WILL RECEIVE SEDATION OR GENERAL ANESTHESIA DURING THEIR MRI PROCEDURE:  IF YOU WILL RECEIVE SEDATION (take medicine to help you relax during your exam):   You must get the medicine from your doctor before you arrive. Bring the medicine to the exam. Do not  take it at home.   Arrive one hour early. Bring someone who can take you home after the test. Your medicine will make you sleepy. After the exam, you may not drive, take a bus or take a taxi by yourself.   No eating 8 hours before your exam. You may have clear liquids up until 4 hours before your exam. (Clear liquids include water, clear tea, black coffee and fruit juice without pulp.)  IF YOU WILL RECEIVE ANESTHESIA (be asleep for your exam):   Arrive 1 1/2 hours early. Bring someone who can take you home after the test. You may not drive, take a bus or take a taxi by yourself.   No eating 8 hours before your exam. You may have clear liquids up until 4 hours before your exam. (Clear liquids include water, clear tea, black coffee and fruit juice without pulp.)  Please call the Imaging Department at your exam site with any questions.            Jan 12, 2018  8:00 AM CST   (Arrive by 7:45 AM)   RETURN NEUROTOLOGY with Homero Bartlett MD   [facility] ([facility] [place])    [address] [place], [place] [id number] [phone]              Future tests that were ordered for you today     Open Future Orders        Priority Expected Expires Ordered    CT Temporal orbital sella w/o contrast Routine  12/27/2018 12/27/2017    MRI Temporal Bone/IAC With and W/O Contrast Routine  12/27/2018 12/27/2017            Who to contact     Please call your clinic at [phone] to:    Ask questions about your health    Make or cancel appointments    Discuss your medicines    Learn about your test results    Speak to your doctor   If you have compliments or concerns about an experience at your clinic, or if you wish to file a complaint, please contact [facility] Patient Relations at [phone] or email us at [email or web address]         Additional Information About Your Visit        Eurofficehart Information     Lono is an electronic gateway  that provides easy, online access to your medical records. With Crocs, you can request a clinic appointment, read your test results, renew a prescription or communicate with your care team.     To sign up for Crocs visit the website at [email or web address]   You will be asked to enter the access code listed below, as well as some personal information. Please follow the directions to create your username and password.     Your access code is: [email or web address]  Expires: 3/13/2018  6:30 AM     Your access code will  in 90 days. If you need help or a new code, please contact your [facility] or call [phone] for assistance.        Care EveryWhere ID     This is your Care EveryWhere ID. This could be used by other organizations to access your [facility] medical records  [id number]         Blood Pressure from Last 3 Encounters:   No data found for BP    Weight from Last 3 Encounters:   17 90.3 kg (199 lb)              We Performed the Following     AUDIOGRAM/TYMPANOGRAM - INTERFACE     AUDIOLOGY ADULT REFERRAL     [facility] Speech Recog ([id number])     Tymps / Reflex   ([id number])        Primary Care Provider Office Phone # Fax #    Alexi Watts [phone]       [address] [address]        Equal Access to Services     ROSANGELA ESCOBEDO AH: Hadii aad ku hadasho Soomaali, waaxda luqadaha, qaybta kaalmada adeegyada, paul comer haygeorge bocanegra. So [facility] [phone].    ATENCIÓN: Si habla español, tiene a horton disposición servicios gratuitos de asistencia lingüística. Maik al [phone].    We comply with applicable federal civil rights laws and [place] laws. We do not discriminate on the basis of race, color, national origin, age, disability, sex, sexual orientation, or gender identity.            Thank you!     Thank you for choosing [facility] AUDIOLOGY  for your care. Our goal is always to provide you  with excellent care. Hearing back from our patients is one way we can continue to improve our services. Please take a few minutes to complete the written survey that you may receive in the mail after your visit with us. Thank you!             Your Updated Medication List - Protect others around you: Learn how to safely use, store and throw away your medicines at www.disposemymeds.org.          This list is accurate as of: 12/27/17  1:09 PM.  Always use your most recent med list.                   Brand Name Dispense Instructions for use Diagnosis    hydrocortisone 2.5 % cream     30 g    Mix with ketoconazole 2% cream. Apply twice daily to the face for 1 week for flares.    Seborrheic dermatitis       ketoconazole 2 % cream    NIZORAL    30 g    Mix with hydrocortisone 2.5% cream. Apply twice daily to the face for 1 week for flares    Seborrheic dermatitis       SERTRALINE HCL PO      Take 100 mg by mouth daily

## 2017-12-27 NOTE — NURSING NOTE
"Chief Complaint   Patient presents with     Consult     Consultation Eustacian tube disfunction, tinitus bilateral. Symptoms post sinus and nose surgery. complaints of TMJ as well      Height 1.803 m (5' 11\"), weight 90.3 kg (199 lb).    Fili Mcfarland    "

## 2017-12-27 NOTE — PROGRESS NOTES
Otolaryngology Consult Note  December 27, 2017      CC: Right sided tinnitus     HPI: Deacon Torres is a 57 year old male with no significant PMHx who was referred to our clinic today for right sided tinnitus. His symptoms started 6 months ago after undergoing at FESS/septoplasty by an outside ENT. After the surgery, he guy as though he could hear his voice louder in his right ear, and that he could also hear his heart beat and breathing louder in his ears. He reports that his tinnitus is a constant ringing, and nothing makes it better or worse. He denies vertigo or otorrhea. He does report some right sided ear pain, as well as crepitus in his TMJ. He has been seen by other outside ENTs, who have performed flexible laryngoscopies and have ruled out patulous eustachian tube on their exam. The patient's symptoms are distressing, and he and his significant other would like to uncover the underlying cause of his tinnitus. The patient's girlfriend has done extensive research regarding his symptoms, and was asking whether the surgical tools or intubation during the surgery could have caused his symptoms. We believe this is less likely.    The patient did get an audiogram today, which did show an asymmetric SNHL in the right ear at the higher frequenicies. 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     Past Medical History:   Diagnosis Date     Carpal tunnel syndrome      Depression      Depressive disorder      Gastroesophageal reflux disease      Migraines      Tinnitus        Past Surgical History:   Procedure Laterality Date     ADENOIDECTOMY       ENT SURGERY       GI SURGERY       TONSILLECTOMY         Current Outpatient Prescriptions   Medication Sig Dispense Refill     SERTRALINE HCL PO Take 100 mg by mouth daily       hydrocortisone 2.5 % cream Mix with ketoconazole 2% cream. Apply twice daily to the face for  "1 week for flares. (Patient not taking: Reported on 12/27/2017) 30 g 3     ketoconazole (NIZORAL) 2 % cream Mix with hydrocortisone 2.5% cream. Apply twice daily to the face for 1 week for flares (Patient not taking: Reported on 12/27/2017) 30 g 3          Allergies   Allergen Reactions     Codeine Itching       Social History     Social History     Marital status: Single     Spouse name: N/A     Number of children: N/A     Years of education: N/A     Occupational History     Not on file.     Social History Main Topics     Smoking status: Never Smoker     Smokeless tobacco: Never Used     Alcohol use 0.0 oz/week     Drug use: No     Sexual activity: Yes     Partners: Female     Birth control/ protection: Male Surgical, Female Surgical     Other Topics Concern     Not on file     Social History Narrative       Family History   Problem Relation Age of Onset     CANCER Maternal Grandfather      CEREBROVASCULAR DISEASE Paternal Grandfather        ROS: 12 point review of systems is negative unless noted in HPI.    PHYSICAL EXAM:  General: laying in bed, no acute distress  Ht 1.803 m (5' 11\")  Wt 90.3 kg (199 lb)  BMI 27.75 kg/m2  HEAD: normocephalic, atraumatic  Face: symmetrical, CN VII intact bilaterally (HB 1), no swelling, edema, or erythema. Sensation V1-V3 intact and equal bilaterally.   Eyes: EOMI without spontaneous or gaze evoked nystagmus, PERRL, clear sclera  Ears: no tragal tenderness, external ear canal open and clear bilaterally, TMs clear bilaterally, no evidence of infection or effusion; + Hitselberger's sign in Right EAC. Jennings lateralized to right, but AC>BC bilaterally  Nose: no anterior drainage, intact and midline septum without perforation or hematoma   Mouth: moist, no ulcers, no jaw or tooth tenderness, tongue midline and symmetric  Oropharynx: tonsils within normal limits, uvula midline, no oropharyngeal erythema  Neck: no LAD, trachea midline  Neuro: cranial nerves 2-12 grossly " intact      Assessment and Plan   Deacon Torres is a 57 year old male with no significant PMHx who was referred to our clinic today for right sided tinnitus. He states that his symptoms, autophony and constant tinnintus, started 6 months ago just after his FESS/septoplasty. He had an audiogram done today which showed an asymmetric SNHL in the right ear at the higher frequencies. On examination, his TMs are unremarkable, but he did have a positive Hitselberger's sign  - Brain MRI and Temporal bone CT  - Referral to audiology for c-VEMP testing  - Referral to dentistry for TMJ arthralgia   - We will contact Mr. Torres with the results of the CT/MRI    Avelina Croft MD  Otolaryngology-Head & Neck Surgery PGY2     I, Homero Bartlett MD, saw this patient with the resident and agree with the resident s findings and plan of care as documented in the resident s note. I personally reviewed the medications, labs and imaging.

## 2017-12-27 NOTE — LETTER
12/27/2017       RE: Deacon Torres  [address]  [place] [address]     Dear Colleague,    Thank you for referring your patient, Deacon Torres, to the [facility] EAR NOSE AND THROAT at [facility]. Please see a copy of my visit note below.    Otolaryngology Consult Note  December 27, 2017      CC: Right sided tinnitus     HPI: Deacon Torres is a 57 year old male with no significant PMHx who was referred to our clinic today for right sided tinnitus. His symptoms started 6 months ago after undergoing at FESS/septoplasty by an outside ENT. After the surgery, he guy as though he could hear his voice louder in his right ear, and that he could also hear his heart beat and breathing louder in his ears. He reports that his tinnitus is a constant ringing, and nothing makes it better or worse. He denies vertigo or otorrhea. He does report some right sided ear pain, as well as crepitus in his TMJ. He has been seen by other outside ENTs, who have performed flexible laryngoscopies and have ruled out patulous eustachian tube on their exam. The patient's symptoms are distressing, and he and his significant other would like to uncover the underlying cause of his tinnitus. The patient's girlfriend has done extensive research regarding his symptoms, and was asking whether the surgical tools or intubation during the surgery could have caused his symptoms. We believe this is less likely.    The patient did get an audiogram today, which did show an asymmetric SNHL in the right ear at the higher frequenicies. 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     Past Medical History:   Diagnosis Date     Carpal tunnel syndrome      Depression      Depressive disorder      Gastroesophageal reflux disease      Migraines      Tinnitus        Past Surgical History:   Procedure Laterality Date      "ADENOIDECTOMY       ENT SURGERY       GI SURGERY       TONSILLECTOMY         Current Outpatient Prescriptions   Medication Sig Dispense Refill     SERTRALINE HCL PO Take 100 mg by mouth daily       hydrocortisone 2.5 % cream Mix with ketoconazole 2% cream. Apply twice daily to the face for 1 week for flares. (Patient not taking: Reported on 12/27/2017) 30 g 3     ketoconazole (NIZORAL) 2 % cream Mix with hydrocortisone 2.5% cream. Apply twice daily to the face for 1 week for flares (Patient not taking: Reported on 12/27/2017) 30 g 3          Allergies   Allergen Reactions     Codeine Itching       Social History     Social History     Marital status: Single     Spouse name: N/A     Number of children: N/A     Years of education: N/A     Occupational History     Not on file.     Social History Main Topics     Smoking status: Never Smoker     Smokeless tobacco: Never Used     Alcohol use 0.0 oz/week     Drug use: No     Sexual activity: Yes     Partners: Female     Birth control/ protection: Male Surgical, Female Surgical     Other Topics Concern     Not on file     Social History Narrative       Family History   Problem Relation Age of Onset     CANCER Maternal Grandfather      CEREBROVASCULAR DISEASE Paternal Grandfather        ROS: 12 point review of systems is negative unless noted in HPI.    PHYSICAL EXAM:  General: laying in bed, no acute distress  Ht 1.803 m (5' 11\")  Wt 90.3 kg (199 lb)  BMI 27.75 kg/m2  HEAD: normocephalic, atraumatic  Face: symmetrical, CN VII intact bilaterally (HB 1), no swelling, edema, or erythema. Sensation V1-V3 intact and equal bilaterally.   Eyes: EOMI without spontaneous or gaze evoked nystagmus, PERRL, clear sclera  Ears: no tragal tenderness, external ear canal open and clear bilaterally, TMs clear bilaterally, no evidence of infection or effusion; + Hitselberger's sign in Right EAC. Jennings lateralized to right, but AC>BC bilaterally  Nose: no anterior drainage, intact and " midline septum without perforation or hematoma   Mouth: moist, no ulcers, no jaw or tooth tenderness, tongue midline and symmetric  Oropharynx: tonsils within normal limits, uvula midline, no oropharyngeal erythema  Neck: no LAD, trachea midline  Neuro: cranial nerves 2-12 grossly intact      Assessment and Plan   Deacon Torres is a 57 year old male with no significant PMHx who was referred to our clinic today for right sided tinnitus. He states that his symptoms, autophony and constant tinnintus, started 6 months ago just after his FESS/septoplasty. He had an audiogram done today which showed an asymmetric SNHL in the right ear at the higher frequencies. On examination, his TMs are unremarkable, but he did have a positive Hitselberger's sign  - Brain MRI and Temporal bone CT  - Referral to audiology for c-VEMP testing  - Referral to dentistry for TMJ arthralgia   - We will contact Mr. Torres with the results of the CT/MRI    Avelina Croft MD  Otolaryngology-Head & Neck Surgery PGY2     I, Homero Bartlett MD, saw this patient with the resident and agree with the resident s findings and plan of care as documented in the resident s note. I personally reviewed the medications, labs and imaging.      Again, thank you for allowing me to participate in the care of your patient.      Sincerely,    Homero Bartlett MD

## 2017-12-27 NOTE — MR AVS SNAPSHOT
After Visit Summary   12/27/2017    Deacon Torres    MRN: [id number]           Patient Information     Date Of Birth          1960        Visit Information        Provider Department      12/27/2017 8:30 AM Homero Bartlett MD [facility] Ear Nose and Throat        Today's Diagnoses     Hearing loss    -  1      Care Instructions    You will have a CT, MRI and VEMP testing- you will be called with the results.   Please call our clinic for any questions,concerns,or worsening symptoms.      Clinic #[phone]       Option 3  for the triage nurse.    Who will benefit from a VEMP test?  Patients with symptoms of vertigo, dizziness or imbalance may have an inner ear vestibular disorder. A battery of tests is available to investigate different components or pathologies of the vestibular system. ? VEMP (vestibular evoked myogenic potential) - to assess function of the saccule and inferior vestibular nerves  What is the VEMP test?  The saccule is a vestibular sac of the inner ear. It is sensitive to up and down movements of the head in the vertical plane, and in response to these movements it initiates impulses along the inferior vestibular nerve. These impulses control head movement via the vestibular colic reflex by relaxation of the sternocleidomastoid (SCM) neck muscles.  During the VEMP test the saccule is stimulated by loud acoustic clicks rather than head movements. A supine patient raises their head to flex their SCM muscles. Clicks are played via earphones and resultant SCM muscle relaxation is monitored by measuring electrical activity in the muscle. Abnormal relaxation is an indicator of abnormal saccule or inferior vestibular nerve function.  The supine patient must be able to raise their head whilst keeping their shoulders down, but only for 4 to 6 repeats of around 15 seconds each.            Follow-ups after your visit        Additional Services     AUDIOLOGY BALANCE REFERRAL       Your  provider has referred you to: [facility] [phone] [email or web address]        Procedure(s): Vestibular Evoked Myogenic Potentials (VEMP) Both                  Your next 10 appointments already scheduled     Dec 29, 2017 10:30 AM CST   VEMP with Missael Quesada,  AUD ABR MACHINE [address] [place] [address] Audiology ([facility])    [address] [place] [address]  4th Floor  [facility] [id number] [phone]           How is the test done?  First, we clean the skin. Then, we place sticky pathes (electrodes) on the forehead, side of the neck and under the eyes.  We place a foam plug in the ear canal that plays loud clicking sounds.  You then perform several tasks in response to the loud sounds, including: - Turning your head to one side and holding it there for one minute - Looking up with your eyes and holding the there for one minute  How do I prepare?  Do not take any sleeping pills, sedatives, or muscle relaxants that might keep you from being able to do the above tasks.  Please do not wear shirts with high collars or turtlenecks.  Men with neck hair should make sure the sides of their neck are shaved.  You should be able to return to your normal activites right after the testing.            Jan 08, 2018  5:40 PM CST   CT TEMPORAL ORBITAL SELLA W/O CONTRAST with [id number]   [facility] [place] ([facility])    [address] [place] [id number] [phone]           Please bring any scans or X-rays taken at other hospitals, if similar tests were done. Also bring a list of your medicines, including vitamins, minerals and over-the-counter drugs. It is safest to leave personal items at home.  Be sure to tell your doctor:   If you have any allergies.   If there s any chance you are pregnant.   If you are breastfeeding.   If you have any special needs.  You do not need to do anything  special to prepare.  Please wear loose clothing, such as a sweat suit or jogging clothes. Avoid snaps, zippers and other metal. We may ask you to undress and put on a hospital gown.            Jan 08, 2018  6:15 PM CST   (Arrive by 6:00 PM)   MR BRAIN W/O & W CONTRAST with [id number]   [facility] MRI ([facility] [place])    [address]  1st Floor  [facility] [address] [phone]           Take your medicines as usual, unless your doctor tells you not to. Bring a list of your current medicines to your exam (including vitamins, minerals and over-the-counter drugs).  You will be given intravenous contrast for this exam. To prepare:   The day before your exam, drink extra fluids at least six 8-ounce glasses (unless your doctor tells you to restrict your fluids).   Have a blood test (creatinine test) within 30 days of your exam. Go to your clinic or Diagnostic Imaging Department for this test.  The MRI machine uses a strong magnet. Please wear clothes without metal (snaps, zippers). A sweatsuit works well, or we may give you a hospital gown.  Please remove any body piercings and hair extensions before you arrive. You will also remove watches, jewelry, hairpins, wallets, dentures, partial dental plates and hearing aids. You may wear contact lenses, and you may be able to wear your rings. We have a safe place to keep your personal items, but it is safer to leave them at home.   **IMPORTANT** THE INSTRUCTIONS BELOW ARE ONLY FOR THOSE PATIENTS WHO HAVE BEEN TOLD THEY WILL RECEIVE SEDATION OR GENERAL ANESTHESIA DURING THEIR MRI PROCEDURE:  IF YOU WILL RECEIVE SEDATION (take medicine to help you relax during your exam):   You must get the medicine from your doctor before you arrive. Bring the medicine to the exam. Do not take it at home.   Arrive one hour early. Bring someone who can take you home after the test. Your medicine will make you sleepy. After the exam, you may not drive,  take a bus or take a taxi by yourself.   No eating 8 hours before your exam. You may have clear liquids up until 4 hours before your exam. (Clear liquids include water, clear tea, black coffee and fruit juice without pulp.)  IF YOU WILL RECEIVE ANESTHESIA (be asleep for your exam):   Arrive 1 1/2 hours early. Bring someone who can take you home after the test. You may not drive, take a bus or take a taxi by yourself.   No eating 8 hours before your exam. You may have clear liquids up until 4 hours before your exam. (Clear liquids include water, clear tea, black coffee and fruit juice without pulp.)  Please call the Imaging Department at your exam site with any questions.            Jan 12, 2018  8:00 AM CST   (Arrive by 7:45 AM)   RETURN NEUROTOLOGY with Homero Bartlett MD   [facility] Ear Nose and Throat ([facility])    [address] [place] [id number] [phone]              Future tests that were ordered for you today     Open Future Orders        Priority Expected Expires Ordered    CT Temporal orbital sella w/o contrast Routine  12/27/2018 12/27/2017    MRI Temporal Bone/IAC With and W/O Contrast Routine  12/27/2018 12/27/2017            Who to contact     Please call your clinic at [phone] to:    Ask questions about your health    Make or cancel appointments    Discuss your medicines    Learn about your test results    Speak to your doctor   If you have compliments or concerns about an experience at your clinic, or if you wish to file a complaint, please contact [facility] Patient Relations at [phone] or email us at [email or web address]         Additional Information About Your Visit        Sarta Information     Sarta is an electronic gateway that provides easy, online access to your medical records. With Sarta, you can request a clinic appointment, read your test results, renew a prescription or  "communicate with your care team.     To sign up for [employer] visit the website at [email or web address]   You will be asked to enter the access code listed below, as well as some personal information. Please follow the directions to create your username and password.     Your access code is: [email or web address]  Expires: 3/13/2018  6:30 AM     Your access code will  in 90 days. If you need help or a new code, please contact your [facility] or call [phone] for assistance.        Care EveryWhere ID     This is your Care EveryWhere ID. This could be used by other organizations to access your [place] medical records  [id number]        Your Vitals Were     Height BMI (Body Mass Index)                1.803 m (5' 11\") 27.75 kg/m2           Blood Pressure from Last 3 Encounters:   No data found for BP    Weight from Last 3 Encounters:   17 90.3 kg (199 lb)              We Performed the Following     AUDIOLOGY BALANCE REFERRAL        Primary Care Provider Office Phone # Fax #    Alexi VALDEZ Mac [phone]       [address] [address]        Equal Access to Services     ANGIE ESCOBEDO : Hadii aad ku hadasho Soomaali, waaxda luqadaha, qaybta kaalmada adeegyada, paul quiñonezn christie bocanegra. So [facility] [phone].    ATENCIÓN: Si habla español, tiene a horton disposición servicios gratuitos de asistencia lingüística. Llame al [phone].    We comply with applicable federal civil rights laws and [place] laws. We do not discriminate on the basis of race, color, national origin, age, disability, sex, sexual orientation, or gender identity.            Thank you!     Thank you for choosing [facility] EAR NOSE AND THROAT  for your care. Our goal is always to provide you with excellent care. Hearing back from our patients is one way we can continue to improve our services. Please take a few minutes to complete the written survey that " you may receive in the mail after your visit with us. Thank you!             Your Updated Medication List - Protect others around you: Learn how to safely use, store and throw away your medicines at www.disposemymeds.org.          This list is accurate as of: 12/27/17  9:57 AM.  Always use your most recent med list.                   Brand Name Dispense Instructions for use Diagnosis    hydrocortisone 2.5 % cream     30 g    Mix with ketoconazole 2% cream. Apply twice daily to the face for 1 week for flares.    Seborrheic dermatitis       ketoconazole 2 % cream    NIZORAL    30 g    Mix with hydrocortisone 2.5% cream. Apply twice daily to the face for 1 week for flares    Seborrheic dermatitis       SERTRALINE HCL PO      Take 100 mg by mouth daily

## 2017-12-27 NOTE — PATIENT INSTRUCTIONS
You will have a CT, MRI and VEMP testing- you will be called with the results.   Please call our clinic for any questions,concerns,or worsening symptoms.      Clinic #[phone]       Option 3  for the triage nurse.    Who will benefit from a VEMP test?  Patients with symptoms of vertigo, dizziness or imbalance may have an inner ear vestibular disorder. A battery of tests is available to investigate different components or pathologies of the vestibular system. ? VEMP (vestibular evoked myogenic potential) - to assess function of the saccule and inferior vestibular nerves  What is the VEMP test?  The saccule is a vestibular sac of the inner ear. It is sensitive to up and down movements of the head in the vertical plane, and in response to these movements it initiates impulses along the inferior vestibular nerve. These impulses control head movement via the vestibular colic reflex by relaxation of the sternocleidomastoid (SCM) neck muscles.  During the VEMP test the saccule is stimulated by loud acoustic clicks rather than head movements. A supine patient raises their head to flex their SCM muscles. Clicks are played via earphones and resultant SCM muscle relaxation is monitored by measuring electrical activity in the muscle. Abnormal relaxation is an indicator of abnormal saccule or inferior vestibular nerve function.  The supine patient must be able to raise their head whilst keeping their shoulders down, but only for 4 to 6 repeats of around 15 seconds each.

## 2017-12-29 ENCOUNTER — OFFICE VISIT (OUTPATIENT)
Dept: AUDIOLOGY | Facility: CLINIC | Age: 57
End: 2017-12-29
Payer: COMMERCIAL

## 2017-12-29 DIAGNOSIS — H93.13 TINNITUS OF BOTH EARS: Primary | ICD-10-CM

## 2017-12-29 NOTE — MR AVS SNAPSHOT
After Visit Summary   12/29/2017    Deacon Torres    MRN: [id number]           Patient Information     Date Of Birth          1960        Visit Information        Provider Department      12/29/2017 10:30 AM Aury Parry AuD;  LEOLA ABR MACHINE [address] [facility] Audiology        Today's Diagnoses     Tinnitus of both ears    -  1       Follow-ups after your visit        Your next 10 appointments already scheduled     Jan 08, 2018  5:40 PM CST   CT TEMPORAL ORBITAL SELLA W/O CONTRAST with UCCT2   [facility]    [address] [address] [phone]           Please bring any scans or X-rays taken at other hospitals, if similar tests were done. Also bring a list of your medicines, including vitamins, minerals and over-the-counter drugs. It is safest to leave personal items at home.  Be sure to tell your doctor:   If you have any allergies.   If there s any chance you are pregnant.   If you are breastfeeding.   If you have any special needs.  You do not need to do anything special to prepare.  Please wear loose clothing, such as a sweat suit or jogging clothes. Avoid snaps, zippers and other metal. We may ask you to undress and put on a hospital gown.            Jan 08, 2018  6:15 PM CST   (Arrive by 6:00 PM)   MR BRAIN W/O & W CONTRAST with [id number]   [facility] ([facility]    [address] [place] [address] [phone]           Take your medicines as usual, unless your doctor tells you not to. Bring a list of your current medicines to your exam (including vitamins, minerals and over-the-counter drugs).  You will be given intravenous contrast for this exam. To prepare:   The day before your exam, drink extra fluids at least six 8-ounce glasses (unless your doctor tells you to restrict your fluids).   Have a blood test (creatinine test) within  30 days of your exam. Go to your clinic or Diagnostic Imaging Department for this test.  The MRI machine uses a strong magnet. Please wear clothes without metal (snaps, zippers). A sweatsuit works well, or we may give you a hospital gown.  Please remove any body piercings and hair extensions before you arrive. You will also remove watches, jewelry, hairpins, wallets, dentures, partial dental plates and hearing aids. You may wear contact lenses, and you may be able to wear your rings. We have a safe place to keep your personal items, but it is safer to leave them at home.   **IMPORTANT** THE INSTRUCTIONS BELOW ARE ONLY FOR THOSE PATIENTS WHO HAVE BEEN TOLD THEY WILL RECEIVE SEDATION OR GENERAL ANESTHESIA DURING THEIR MRI PROCEDURE:  IF YOU WILL RECEIVE SEDATION (take medicine to help you relax during your exam):   You must get the medicine from your doctor before you arrive. Bring the medicine to the exam. Do not take it at home.   Arrive one hour early. Bring someone who can take you home after the test. Your medicine will make you sleepy. After the exam, you may not drive, take a bus or take a taxi by yourself.   No eating 8 hours before your exam. You may have clear liquids up until 4 hours before your exam. (Clear liquids include water, clear tea, black coffee and fruit juice without pulp.)  IF YOU WILL RECEIVE ANESTHESIA (be asleep for your exam):   Arrive 1 1/2 hours early. Bring someone who can take you home after the test. You may not drive, take a bus or take a taxi by yourself.   No eating 8 hours before your exam. You may have clear liquids up until 4 hours before your exam. (Clear liquids include water, clear tea, black coffee and fruit juice without pulp.)  Please call the Imaging Department at your exam site with any questions.            Jan 12, 2018  8:00 AM CST   (Arrive by 7:45 AM)   RETURN NEUROTOLOGY with Homero Bartlett MD   [facility] Ear Nose and Throat ([facility] [place])     [address] [id number] [phone]              Who to contact     Please call your clinic at [phone] to:    Ask questions about your health    Make or cancel appointments    Discuss your medicines    Learn about your test results    Speak to your doctor   If you have compliments or concerns about an experience at your clinic, or if you wish to file a complaint, please contact [facility] Patient Relations at [phone] or email us at [email or web address]         Additional Information About Your Visit        RenovarharIowa Approach Information     TrialBee is an electronic gateway that provides easy, online access to your medical records. With TrialBee, you can request a clinic appointment, read your test results, renew a prescription or communicate with your care team.     To sign up for TrialBee visit the website at [email or web address]   You will be asked to enter the access code listed below, as well as some personal information. Please follow the directions to create your username and password.     Your access code is: [email or web address]  Expires: 3/13/2018  6:30 AM     Your access code will  in 90 days. If you need help or a new code, please contact your [facility] Clinic or call [phone] for assistance.        Care EveryWhere ID     This is your Care EveryWhere ID. This could be used by other organizations to access your [place] medical records  [id number]         Blood Pressure from Last 3 Encounters:   No data found for BP    Weight from Last 3 Encounters:   17 90.3 kg (199 lb)              We Performed the Following     Evoked Myogenic Potential (80882)     Evoked Myogenic Potential, Occular (50196)     Tympanometry (impedance - testing) (40445)        Primary Care Provider Office Phone # Fax #    Alexi Watts [phone]       [address] [address]         Equal Access to Services     [facility] Hospital[facility] : Hadii aad ku hadmaryhaydee Meseretmonik, wanadineda luqdianeha, qaeleazarta parishshantanupalu carrasco. So [facility] [phone].    ATENCIÓN: Si habla español, tiene a horton disposición servicios gratuitos de asistencia lingüística. Llame al [phone].    We comply with applicable federal civil rights laws and [place] laws. We do not discriminate on the basis of race, color, national origin, age, disability, sex, sexual orientation, or gender identity.            Thank you!     Thank you for choosing [facility] AUDIOLOGY  for your care. Our goal is always to provide you with excellent care. Hearing back from our patients is one way we can continue to improve our services. Please take a few minutes to complete the written survey that you may receive in the mail after your visit with us. Thank you!             Your Updated Medication List - Protect others around you: Learn how to safely use, store and throw away your medicines at www.disposemymeds.org.          This list is accurate as of: 12/29/17 12:05 PM.  Always use your most recent med list.                   Brand Name Dispense Instructions for use Diagnosis    hydrocortisone 2.5 % cream     30 g    Mix with ketoconazole 2% cream. Apply twice daily to the face for 1 week for flares.    Seborrheic dermatitis       ketoconazole 2 % cream    NIZORAL    30 g    Mix with hydrocortisone 2.5% cream. Apply twice daily to the face for 1 week for flares    Seborrheic dermatitis       SERTRALINE HCL PO      Take 100 mg by mouth daily

## 2017-12-29 NOTE — PROGRESS NOTES
AUDIOLOGY REPORT    SUBJECTIVE: Deacon Torres was seen in the Audiology Clinic at the [facility] on 12/29/2017 for a vestibular evoked myogenic potential (VEMP) evaluation, referred by Homero Bartlett M.D.. Deacon reports that since he underwent septoplasty this May, he has had bilateral tinnitus, (worse in right), hears his voice louder then before, and hears his breathing and pulse. He does not get dizzy. He reports right ear pain. A 1 while sitting, but can jump up to a  6 to 7 on the pain scale. . Hearing evaluations have revealed normal sloping to moderate sensorineural hearing loss in the right ear, and normal to mild sensorineural hearing loss in the left ear..    OBJECTIVE:   VEMP testing is performed using an auditory evoked potentials system. Surface electrodes are placed in several locations on the patient's head and neck in order to record muscle motoneuron activity in response to loud auditory stimuli. This testing assesses very specific vestibular pathways in the inner ear and central nervous system. cVEMP testing is performed with electrodes placed on the patient's sternocleidomastoid muscle, and is used to assess the saccular organ, afferent inferior vestibular nerve and vestibular nuclei within the brainstem. oVEMP testing is performed with electrodes placed under the patient's eyes, and is used to assess the utricle and afferent superior vestibular nerve and nuclei within the brainstem.  Patients that may benefit from this procedure are those with complaints of vertigo and imbalance or those suspected of superior canal dehiscence. A total of 90 minutes was spent completing the VEMP testing today.    Tympanograms      RIGHT: Slightly hypercompliant with normal volume.     LEFT:  Slightly hypercompliant with normal volume.    cVEMP Thresholds via 500 Hz toneburst:    RIGHT: 90 dB nHL which  suggest normal saccular and inferior vestibular nerve  function    LEFT: 65 dB nHL which  indicate abnormal saccular or inferior vestibular nerve function    oVEMP Thresholds via 500 Hz toneburst:     RIGHT: 85 dB nHL which suggests normal utricle and superior vestibular nerve function    LEFT: 90 dB nHL which suggests normal utricle and superior vestibular nerve function    AMPLITUDE ASYMMETRY: 13.8% WNL (greater than 43% is considered abnormal)    ASSESSMENT: Today's results suggest an abnormal CVEMP on the left side. These results indicate abnormal saccular or inferior vestibular nerve function and normal utricle and superior vestibular nerve function on the left side. Right side was WNL.      PLAN:The patient will follow up with Homero Bartlett M.D. for medical management.     Please call this clinic with questions regarding these results or recommendations.          Daniela Choi, [facility]  Licensed Audiologist  [place] #[id number]

## 2018-01-03 ENCOUNTER — CARE COORDINATION (OUTPATIENT)
Dept: OTOLARYNGOLOGY | Facility: CLINIC | Age: 58
End: 2018-01-03

## 2018-01-03 NOTE — PROGRESS NOTES
VEMP testing done 12-29-17, abnormal CVEMP on the left side. Dr. Bartlett will review with pt on rtc .  Above message left on pt voice mail.  Nohelia Elizalde RN  ENT Care Coordinator   Otology  [phone]  1/3/2018 4:28 PM

## 2018-01-08 ENCOUNTER — RADIANT APPOINTMENT (OUTPATIENT)
Dept: CT IMAGING | Facility: CLINIC | Age: 58
End: 2018-01-08
Attending: OTOLARYNGOLOGY
Payer: COMMERCIAL

## 2018-01-08 ENCOUNTER — RADIANT APPOINTMENT (OUTPATIENT)
Dept: MRI IMAGING | Facility: CLINIC | Age: 58
End: 2018-01-08
Attending: OTOLARYNGOLOGY
Payer: COMMERCIAL

## 2018-01-08 DIAGNOSIS — H91.91 HEARING LOSS OF RIGHT EAR, UNSPECIFIED HEARING LOSS TYPE: ICD-10-CM

## 2018-01-08 RX ORDER — GADOBUTROL 604.72 MG/ML
10 INJECTION INTRAVENOUS ONCE
Status: COMPLETED | OUTPATIENT
Start: 2018-01-08 | End: 2018-01-08

## 2018-01-08 RX ADMIN — GADOBUTROL 10 ML: 604.72 INJECTION INTRAVENOUS at 18:15

## 2018-01-09 NOTE — DISCHARGE INSTRUCTIONS
MRI Contrast Discharge Instructions    The IV contrast you received today will pass out of your body in your  urine. This will happen in the next 24 hours. You will not feel this process.  Your urine will not change color.    Drink at least 4 extra glasses of water or juice today (unless your doctor  has restricted your fluids). This reduces the stress on your kidneys.  You may take your regular medicines.    If you are on dialysis: It is best to have dialysis today.    If you have a reaction: Most reactions happen right away. If you have  any new symptoms after leaving the hospital (such as hives or swelling),  call your hospital at the correct number below. Or call your family doctor.  If you have breathing distress or wheezing, call 911.    Special instructions: ***    I have read and understand the above information.    Signature:______________________________________ Date:___________    Staff:__________________________________________ Date:___________     Time:__________    [place] [facility] Departments:    ___[place]: [phone]  ___[facility]: [phone]  ___[place]: [phone] ___[facility]: [phone]  ___[facility]: [phone]  ___[facility]: [phone]  ___[phone]  ___[facility]: [phone]  ___[phone]

## 2018-01-12 ENCOUNTER — OFFICE VISIT (OUTPATIENT)
Dept: OTOLARYNGOLOGY | Facility: CLINIC | Age: 58
End: 2018-01-12
Payer: COMMERCIAL

## 2018-01-12 DIAGNOSIS — H93.11 TINNITUS, RIGHT: ICD-10-CM

## 2018-01-12 DIAGNOSIS — H92.01 OTALGIA, RIGHT: Primary | ICD-10-CM

## 2018-01-12 ASSESSMENT — PAIN SCALES - GENERAL: PAINLEVEL: MILD PAIN (2)

## 2018-01-12 NOTE — PROGRESS NOTES
History of Present Illness:  This is a 57-year-old man who came in today for a discussion concerning his diagnosis. Mr. Trores reports that he underwent nasal septal surgery and was put to sleep for a period and after that he reports that he has had ear pain and aural fullness on the right side along with tinnitus. The patient had an abnormal sensory test in the ear canal showing a Hitson Adam sign. I sent him for CVEMP testing and the CVEMP indicated that the right ear was abnormal with a low reflex.    Patient Supplied Answers to Review of Systems   ENT ROS 1/12/2018   Constitutional -   Neurology -   Ears, Nose, Throat Ear pain, Ringing/noise in ears, Nasal congestion or drainage   Musculoskeletal -         Imaging:  I also obtained a high-resolution CT scan which was essentially normal.     Audiogram: His audiogram shows a minimal air bone gap and low frequencies on the right ear. Otherwise, everything looked normal.    Assessment and Plan:  Today we talked about a plan.    I discussed the fact that I will lack a firm diagnosis. TMJ most likely accounts for the ear pain but is less likely to account for the tinnitus and may or may not account for some of his aural fullness.    I talked with him about the findings and suggested that he see a TMJ specialist and follow a low-sodium diet. He reports that he is already on a low-sodium diet. While a diuretic can be added, I think that without further findings it is hard to recommend treatment without a better diagnosis. If he has appearance of increasing aural fullness, increasing tinnitus, or vertigo I would obtain an ECOG and potentially an MRI scan. Based on what we have now although, I think this may be unlikely to give us a firm diagnosis. I will continue to work on this with him by seeing him again in six months or sooner if he has change in symptoms.    I spent a total of 30 minutes face-to-face with Deacon Torres during today s office visit. Over 50% of  this time was spent counseling the patient and/or coordinating care regarding diagnosis and treatment.      SL/ms

## 2018-01-12 NOTE — NURSING NOTE
Chief Complaint   Patient presents with     RECHECK     Return Right ear pain - a little today, states pt. F/U MRI, CT and VEMP done.        TIMO Willett LPN

## 2018-01-12 NOTE — LETTER
1/12/2018      RE: Deacon Torres  [address]  [place] [address]       History of Present Illness:  This is a 57-year-old man who came in today for a discussion concerning his diagnosis. Mr. Torres reports that he underwent nasal septal surgery and was put to sleep for a period and after that he reports that he has had ear pain and aural fullness on the right side along with tinnitus. The patient had an abnormal sensory test in the ear canal showing a Hitson Adam sign. I sent him for CVEMP testing and the CVEMP indicated that the right ear was abnormal with a low reflex.    Patient Supplied Answers to Review of Systems   ENT ROS 1/12/2018   Constitutional -   Neurology -   Ears, Nose, Throat Ear pain, Ringing/noise in ears, Nasal congestion or drainage   Musculoskeletal -         Imaging:  I also obtained a high-resolution CT scan which was essentially normal.     Audiogram: His audiogram shows a minimal air bone gap and low frequencies on the right ear. Otherwise, everything looked normal.    Assessment and Plan:  Today we talked about a plan.    I discussed the fact that I will lack a firm diagnosis. TMJ most likely accounts for the ear pain but is less likely to account for the tinnitus and may or may not account for some of his aural fullness.    I talked with him about the findings and suggested that he see a TMJ specialist and follow a low-sodium diet. He reports that he is already on a low-sodium diet. While a diuretic can be added, I think that without further findings it is hard to recommend treatment without a better diagnosis. If he has appearance of increasing aural fullness, increasing tinnitus, or vertigo I would obtain an ECOG and potentially an MRI scan. Based on what we have now although, I think this may be unlikely to give us a firm diagnosis. I will continue to work on this with him by seeing him again in six months or sooner if he has change in symptoms.    I spent a total of 30  minutes face-to-face with Deacon Torres during today s office visit. Over 50% of this time was spent counseling the patient and/or coordinating care regarding diagnosis and treatment.      SL/ms Homero Bartlett MD

## 2018-01-12 NOTE — LETTER
1/12/2018       RE: Deacon Torres  [address]  [place] [address]     Dear Colleague,    Thank you for referring your patient, Deacon Torres, to the [facility] EAR NOSE AND THROAT at [facility]. Please see a copy of my visit note below.    History of Present Illness:  This is a 57-year-old man who came in today for a discussion concerning his diagnosis. Mr. Torres reports that he underwent nasal septal surgery and was put to sleep for a period and after that he reports that he has had ear pain and aural fullness on the right side along with tinnitus. The patient had an abnormal sensory test in the ear canal showing a Hitson Adam sign. I sent him for CVEMP testing and the CVEMP indicated that the right ear was abnormal with a low reflex.    Patient Supplied Answers to Review of Systems  [facility] ROS 1/12/2018   Constitutional -   Neurology -   Ears, Nose, Throat Ear pain, Ringing/noise in ears, Nasal congestion or drainage   Musculoskeletal -         Imaging:  I also obtained a high-resolution CT scan which was essentially normal.     Audiogram: His audiogram shows a minimal air bone gap and low frequencies on the right ear. Otherwise, everything looked normal.    Assessment and Plan:  Today we talked about a plan.    I discussed the fact that I will lack a firm diagnosis. TMJ most likely accounts for the ear pain but is less likely to account for the tinnitus and may or may not account for some of his aural fullness.    I talked with him about the findings and suggested that he see a TMJ specialist and follow a low-sodium diet. He reports that he is already on a low-sodium diet. While a diuretic can be added, I think that without further findings it is hard to recommend treatment without a better diagnosis. If he has appearance of increasing aural fullness, increasing tinnitus, or vertigo I would obtain an ECOG and potentially an MRI scan. Based on what we have now  although, I think this may be unlikely to give us a firm diagnosis. I will continue to work on this with him by seeing him again in six months or sooner if he has change in symptoms.    I spent a total of 30 minutes face-to-face with Deacon Torres during today s office visit. Over 50% of this time was spent counseling the patient and/or coordinating care regarding diagnosis and treatment.      SL/ms      Again, thank you for allowing me to participate in the care of your patient.      Sincerely,    Homero Bartlett MD

## 2018-01-12 NOTE — MR AVS SNAPSHOT
After Visit Summary   2018    Deacon Torres    MRN: [id number]           Patient Information     Date Of Birth          1960        Visit Information        Provider Department      2018 8:00 AM Homero Bartlett MD [facility] Ear Nose and Throat        Care Instructions    Please call Nohelia in 6 months with an update, [phone]   Please call our clinic for any questions,concerns,or worsening symptoms.      Clinic #[phone]       Option 3  for the triage nurse.          Follow-ups after your visit        Who to contact     Please call your clinic at [phone] to:    Ask questions about your health    Make or cancel appointments    Discuss your medicines    Learn about your test results    Speak to your doctor   If you have compliments or concerns about an experience at your clinic, or if you wish to file a complaint, please contact [facility] Patient Relations at [phone] or email us at [email or web address]         Additional Information About Your Visit        MyChart Information     ByteLight is an electronic gateway that provides easy, online access to your medical records. With ByteLight, you can request a clinic appointment, read your test results, renew a prescription or communicate with your care team.     To sign up for ByteLight visit the website at [email or web address]   You will be asked to enter the access code listed below, as well as some personal information. Please follow the directions to create your username and password.     Your access code is: [email or web address]  Expires: 3/13/2018  6:30 AM     Your access code will  in 90 days. If you need help or a new code, please contact your [facility] Clinic or call [phone] for assistance.        Care EveryWhere ID     This is your Care EveryWhere ID. This could be used by other organizations to access your [place] medical records  [id number]          Blood Pressure from Last 3 Encounters:   No data found for BP    Weight from Last 3 Encounters:   12/27/17 90.3 kg (199 lb)              Today, you had the following     No orders found for display       Primary Care Provider Office Phone # Fax #    Alexi Watts [phone]       [facility] [address]  [facility] [address]        Equal Access to Services     [facility]: Hadii aad ku hadasho Soomaali, waaxda luqadaha, qaybta kaalmada adeegyada, waxay idiin hayaan adeeg kharash la'aan ah. So [facility] [phone].    ATENCIÓN: Si habla español, tiene a horton disposición servicios gratuitos de asistencia lingüística. Jesúsame al [phone].    We comply with applicable federal civil rights laws and [place] laws. We do not discriminate on the basis of race, color, national origin, age, disability, sex, sexual orientation, or gender identity.            Thank you!     Thank you for choosing [facility] EAR NOSE AND THROAT  for your care. Our goal is always to provide you with excellent care. Hearing back from our patients is one way we can continue to improve our services. Please take a few minutes to complete the written survey that you may receive in the mail after your visit with us. Thank you!             Your Updated Medication List - Protect others around you: Learn how to safely use, store and throw away your medicines at www.disposemymeds.org.          This list is accurate as of: 1/12/18  8:17 AM.  Always use your most recent med list.                   Brand Name Dispense Instructions for use Diagnosis    hydrocortisone 2.5 % cream     30 g    Mix with ketoconazole 2% cream. Apply twice daily to the face for 1 week for flares.    Seborrheic dermatitis       ketoconazole 2 % cream    NIZORAL    30 g    Mix with hydrocortisone 2.5% cream. Apply twice daily to the face for 1 week for flares    Seborrheic dermatitis       SERTRALINE HCL PO      Take 100 mg by mouth daily

## 2018-01-12 NOTE — PATIENT INSTRUCTIONS
Please call Nohelia in 6 months with an update, [phone]   Please call our clinic for any questions,concerns,or worsening symptoms.      Clinic #[phone]       Option 3  for the triage nurse.

## 2018-01-14 PROBLEM — H92.01 OTALGIA, RIGHT: Status: ACTIVE | Noted: 2018-01-14

## 2018-01-24 ENCOUNTER — CARE COORDINATION (OUTPATIENT)
Dept: OTOLARYNGOLOGY | Facility: CLINIC | Age: 58
End: 2018-01-24

## 2018-01-24 DIAGNOSIS — H93.8X9: Primary | ICD-10-CM

## 2018-01-24 DIAGNOSIS — H91.90 HEARING LOSS: ICD-10-CM

## 2018-01-24 NOTE — PROGRESS NOTES
Pt had left message: TMJ work up negative. Ringing has gotten louder. Wondering if anything else would be done, does not want to wait 6 months for follow up.  Reviewed with Dr. Bartlett, per last note:  RTC with ECOG, ( pt did complete MRI 1-8-18)  Pt would like to proceed with ECOG and rtc, will have ENT schedule contact pt.  Nohelia Elizalde RN  ENT Care Coordinator   Otology  [phone]  1/24/2018 12:43 PM

## 2018-02-14 ENCOUNTER — OFFICE VISIT (OUTPATIENT)
Dept: AUDIOLOGY | Facility: CLINIC | Age: 58
End: 2018-02-14
Attending: OTOLARYNGOLOGY
Payer: COMMERCIAL

## 2018-02-14 ENCOUNTER — OFFICE VISIT (OUTPATIENT)
Dept: OTOLARYNGOLOGY | Facility: CLINIC | Age: 58
End: 2018-02-14
Payer: COMMERCIAL

## 2018-02-14 VITALS — HEIGHT: 71 IN | WEIGHT: 199 LBS | BODY MASS INDEX: 27.86 KG/M2

## 2018-02-14 DIAGNOSIS — H93.11 TINNITUS AURIUM, RIGHT: Primary | ICD-10-CM

## 2018-02-14 DIAGNOSIS — H90.3 SENSORINEURAL HEARING LOSS (SNHL) OF BOTH EARS: Primary | ICD-10-CM

## 2018-02-14 RX ORDER — TRIAMTERENE AND HYDROCHLOROTHIAZIDE 37.5; 25 MG/1; MG/1
1 CAPSULE ORAL DAILY
Qty: 30 CAPSULE | Refills: 11 | Status: SHIPPED | OUTPATIENT
Start: 2018-02-14 | End: 2021-04-09

## 2018-02-14 ASSESSMENT — PAIN SCALES - GENERAL: PAINLEVEL: NO PAIN (0)

## 2018-02-14 NOTE — LETTER
2/14/2018       RE: Deacon Torres  [address]  [place] [address]     Dear Colleague,    Thank you for referring your patient, Deacon Torres, to the [facility] EAR NOSE AND THROAT at [facility]. Please see a copy of my visit note below.    History of Present Illness:  This is a 57-year-old man seen in follow-up. On review of his records, he is here for a discussion concerning the laboratory studies.    His symptoms began when he awoke from an anesthetic procedure done for nasal septal surgery. After this operation, he had tinnitus and aural fullness as well as pain in his right ear. Since that time, his symptoms have been unremitting and extremely bothersome.    Medical Record Review:  A more careful review of his records shows that he has a right sensorineural hearing loss which is slightly asymmetric in the low frequencies and has a significant high-frequency component.    Laboratory Studies: I asked for a cVEMP, ECOG and high-resolution CT scan. On review of the data, the left ear appears to have an abnormally low cVEMP threshold. I had originally thought that the cVEMP abnormality was on the same side as symptoms, but on review of the tracings, the threshold change is in the opposite LEFT ear.The ECOG is borderline abnormal on the left side and normal on the right. This borderline anomaly suggests hydrops with an elevated SP/AP ratio. The high-resolution CT scan was reviewed again and it shows no evidence for a superior semicircular canal dehiscence on either side. A review of the labyrinth shows that no other semicircular canal appears to be involved either.    Impression:  Although there is a hearing loss in the right ear which may be connected with the symptom of tinnitus, the lab abnormalities suggest that the left ear is abnormal. 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    Discussion/Plan:  Today we discussed the  issues that I found and the patient is anxious about doing something to improve his condition.     I have recommended that he use a hearing aid/masker and that we try a diuretic for a period of time before contemplating anything further. I warned him that exploratory tympanotomy of the left side which may (or may not) reveal the perilymphatic fistula may not give him relief of the symptoms. He wants to get another opinion and I have agreed to forward this information to one of my colleagues at [facility]. Perhaps they will be able to give him better advice. He also requested that Dr. Thao Olson review this here at the [facility].      Again, thank you for allowing me to participate in the care of your patient.      Sincerely,    Homero Bartlett MD

## 2018-02-14 NOTE — LETTER
2/14/2018      RE: Deacon Torres  [address]  [place] [address]       History of Present Illness:  This is a 57-year-old man seen in follow-up. On review of his records, he is here for a discussion concerning the laboratory studies.    His symptoms began when he awoke from an anesthetic procedure done for nasal septal surgery. After this operation, he had tinnitus and aural fullness as well as pain in his right ear. Since that time, his symptoms have been unremitting and extremely bothersome.    Medical Record Review:  A more careful review of his records shows that he has a right sensorineural hearing loss which is slightly asymmetric in the low frequencies and has a significant high-frequency component.    Laboratory Studies: I asked for a cVEMP, ECOG and high-resolution CT scan. On review of the data, the left ear appears to have an abnormally low cVEMP threshold. I had originally thought that the cVEMP abnormality was on the same side as symptoms, but on review of the tracings, the threshold change is in the opposite LEFT ear.The ECOG is borderline abnormal on the left side and normal on the right. This borderline anomaly suggests hydrops with an elevated SP/AP ratio. The high-resolution CT scan was reviewed again and it shows no evidence for a superior semicircular canal dehiscence on either side. A review of the labyrinth shows that no other semicircular canal appears to be involved either.    Impression:  Although there is a hearing loss in the right ear which may be connected with the symptom of tinnitus, the lab abnormalities suggest that the left ear is abnormal. 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    Discussion/Plan:  Today we discussed the issues that I found and the patient is anxious about doing something to improve his condition.     I have recommended that he use a hearing aid/masker and that we try a diuretic for a period of time before  contemplating anything further. I warned him that exploratory tympanotomy of the left side which may (or may not) reveal the perilymphatic fistula may not give him relief of the symptoms. He wants to get another opinion and I have agreed to forward this information to one of my colleagues at [facility]. Perhaps they will be able to give him better advice. He also requested that Dr. Thao Olson review this here at the [facility].      CINDY/ms Homero Bartlett MD

## 2018-02-14 NOTE — PROGRESS NOTES
AUDIOLOGY REPORT    BACKGROUND INFORMATION: Deacon Torres was seen in Audiology at the [facility] on 2/14/2018 for an electrocochleography (ECochG) evaluation, referred by Dr. Homero Bartlett. The patient reports that since he underwent a nasal septoplasty in May 2017, he has had bilateral tinnitus (worse in the right), that he can hear his pulse, and that his breathing seems louder. He has recently experienced pain in the right ear, but reports that he does not have any pain today. He does report bilateral aural fullness. He reports that he was evaluated and that the pain was determined not to be caused by temporomandibular joint dysfunction. He denies dizziness, changes in hearing, or sensitivity to loud sounds. Most recent hearing evaluation performed revealed normal sloping to moderate sensorineural hearing loss in the right ear, and normal to mild sensorineural hearing loss in the left ear..     TEST RESULTS AND PROCEDURES: Using a microscope tympanic membranes were visualized.  Tympanograms showed normal eardrum mobility bilaterally.   A two-channel ECochG recording was performed for clicks bilaterally.  Clicks for the right ear showed borderline normal SP/AP ratios.    Clicks for the left ear showed borderline normal SP/AP ratios.         Click SP/AP ratio   Right ear  0.419   Left ear  0.404     Abrnormal SP/AP ratios must be greater than .43 for clicks.     SUMMARY AND RECOMMENDATIONS: Today s ECochG revealed SP/AP ratios that are within the normal range; however it should be noted that the SP/AP ratios are on the higher end of the normal range  Call this clinic with questions regarding today s results.  Follow-up with Dr. Homero Bartlett for medical management.    HAIM Felix.  Audiology Doctoral Extern, #[id number]    I was present with the patient for the entire Audiology appointment including all procedures/testing performed by the AuD student, and agree  with the student s assessment and plan as documented.      Baltazar Chery.  Licensed Audiologist  [place] #[id number]

## 2018-02-14 NOTE — PROGRESS NOTES
History of Present Illness:  This is a 57-year-old man seen in follow-up. On review of his records, he is here for a discussion concerning the laboratory studies.    His symptoms began when he awoke from an anesthetic procedure done for nasal septal surgery. After this operation, he had tinnitus and aural fullness as well as pain in his right ear. Since that time, his symptoms have been unremitting and extremely bothersome.    Medical Record Review:  A more careful review of his records shows that he has a right sensorineural hearing loss which is slightly asymmetric in the low frequencies and has a significant high-frequency component.    Laboratory Studies: I asked for a cVEMP, ECOG and high-resolution CT scan. On review of the data, the left ear appears to have an abnormally low cVEMP threshold. I had originally thought that the cVEMP abnormality was on the same side as symptoms, but on review of the tracings, the threshold change is in the opposite LEFT ear.The ECOG is borderline abnormal on the left side and normal on the right. This borderline anomaly suggests hydrops with an elevated SP/AP ratio. The high-resolution CT scan was reviewed again and it shows no evidence for a superior semicircular canal dehiscence on either side. A review of the labyrinth shows that no other semicircular canal appears to be involved either.    Impression:  Although there is a hearing loss in the right ear which may be connected with the symptom of tinnitus, the lab abnormalities suggest that the left ear is abnormal. 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    Discussion/Plan:  Today we discussed the issues that I found and the patient is anxious about doing something to improve his condition.     I have recommended that he use a hearing aid/masker and that we try a diuretic for a period of time before contemplating anything further. I warned him that exploratory tympanotomy of the left  side which may (or may not) reveal the perilymphatic fistula may not give him relief of the symptoms. He wants to get another opinion and I have agreed to forward this information to one of my colleagues at [facility]. Perhaps they will be able to give him better advice. He also requested that Dr. Thao Olson review this here at the [facility].      CINDY/ms

## 2018-02-14 NOTE — LETTER
2/14/2018       RE: Deacon Torres  [address]  [place] [address]     Dear Colleague,    Thank you for referring your patient, Deacon Torres, to the [facility] EAR NOSE AND THROAT at [facility]. Please see a copy of my visit note below.    History of Present Illness:  This is a 57-year-old man seen in follow-up. On review of his records, he is here for a discussion concerning the laboratory studies.    His symptoms began when he awoke from an anesthetic procedure done for nasal septal surgery. After this operation, he had tinnitus and aural fullness as well as pain in his right ear. Since that time, his symptoms have been unremitting and extremely bothersome.    Medical Record Review:  A more careful review of his records shows that he has a right sensorineural hearing loss which is slightly asymmetric in the low frequencies and has a significant high-frequency component.    Laboratory Studies: I asked for a cVEMP, ECOG and high-resolution CT scan. On review of the data, the left ear appears to have an abnormally low cVEMP threshold. I had originally thought that the cVEMP abnormality was on the same side as symptoms, but on review of the tracings, the threshold change is in the opposite LEFT ear.The ECOG is borderline abnormal on the left side and normal on the right. This borderline anomaly suggests hydrops with an elevated SP/AP ratio. The high-resolution CT scan was reviewed again and it shows no evidence for a superior semicircular canal dehiscence on either side. A review of the labyrinth shows that no other semicircular canal appears to be involved either.    Impression:  Although there is a hearing loss in the right ear which may be connected with the symptom of tinnitus, the lab abnormalities suggest that the left ear is abnormal. 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    Discussion/Plan:  Today we discussed the  issues that I found and the patient is anxious about doing something to improve his condition.     I have recommended that he use a hearing aid/masker and that we try a diuretic for a period of time before contemplating anything further. I warned him that exploratory tympanotomy of the left side which may (or may not) reveal the perilymphatic fistula may not give him relief of the symptoms. He wants to get another opinion and I have agreed to forward this information to one of my colleagues at [facility]. Perhaps they will be able to give him better advice. He also requested that Dr. Thao Olson [facility] this [facility] at [facility].      CINDY/ms

## 2018-02-14 NOTE — MR AVS SNAPSHOT
After Visit Summary   2018    Deacon Torres    MRN: [id number]           Patient Information     Date Of Birth          1960        Visit Information        Provider Department      2018 12:00 PM Homero Bartlett MD [facility] Ear Nose and Throat        Today's Diagnoses     Tinnitus aurium, right    -  1      Care Instructions    You will have a consult in audiology for masking device.  Please  the prescription at the pharmacy for dyazide.  Dr. Bartlett will write a letter to [place]- I will let you know when this is done.   Please call our clinic for any questions,concerns,or worsening symptoms.      Clinic #[phone]       Option 3  for the triage nurse.          Follow-ups after your visit        Who to contact     Please call your clinic at [phone] to:    Ask questions about your health    Make or cancel appointments    Discuss your medicines    Learn about your test results    Speak to your doctor            Additional Information About Your Visit        MyChart Information     [employer] is an electronic gateway that provides easy, online access to your medical records. With [employer], you can request a clinic appointment, read your test results, renew a prescription or communicate with your care team.     To sign up for [employer] visit the website at [email or web address]   You will be asked to enter the access code listed below, as well as some personal information. Please follow the directions to create your username and password.     Your access code is: [email or web address]  Expires: 3/13/2018  6:30 AM     Your access code will  in 90 days. If you need help or a new code, please contact your [facility] or call [phone] for assistance.        Care EveryWhere ID     This is your Care EveryWhere ID. This could be used by other organizations to access your [place] medical records  [id number]        Your Vitals Were     Height BMI  "(Body Mass Index)                1.803 m (5' 10.98\") 27.77 kg/m2           Blood Pressure from Last 3 Encounters:   No data found for BP    Weight from Last 3 Encounters:   02/14/18 90.3 kg (199 lb)   12/27/17 90.3 kg (199 lb)              Today, you had the following     No orders found for display         Today's Medication Changes          These changes are accurate as of 2/14/18 11:59 PM.  If you have any questions, ask your nurse or doctor.               Start taking these medicines.        Dose/Directions    triamterene-hydrochlorothiazide 37.5-25 MG per capsule   Commonly known as:  DYAZIDE   Used for:  Tinnitus aurium, right   Started by:  Homero Bartlett MD        Dose:  1 capsule   Take 1 capsule by mouth daily   Quantity:  30 capsule   Refills:  11            Where to get your medicines      These medications were sent to [place] [id number]  [address] [facility] [address]     Phone:  [phone]     triamterene-hydrochlorothiazide 37.5-25 MG per capsule                Primary Care Provider Office Phone # Fax #    Alexi Watts [phone]       [address] [address]        Equal Access to Services     ROSANGELA ESCOBEDO AH: Hadii rebekah ku hadasho Soomaali, waaxda luqadaha, qaybta kaalmada adeegyada, waxay melissain haytiffanin christie bocanegra. So [facility] [phone].    ATENCIÓN: Si habla español, tiene a horton disposición servicios gratuitos de asistencia lingüística. ame al [phone].    We comply with applicable federal civil rights laws and [place] laws. We do not discriminate on the basis of race, color, national origin, age, disability, sex, sexual orientation, or gender identity.            Thank you!     Thank you for choosing [facility] EAR NOSE AND THROAT  for your care. Our goal is always to provide you with excellent care. Hearing back from our patients is one way we can continue to improve our " services. Please take a few minutes to complete the written survey that you may receive in the mail after your visit with us. Thank you!             Your Updated Medication List - Protect others around you: Learn how to safely use, store and throw away your medicines at www.disposemymeds.org.          This list is accurate as of 2/14/18 11:59 PM.  Always use your most recent med list.                   Brand Name Dispense Instructions for use Diagnosis    hydrocortisone 2.5 % cream     30 g    Mix with ketoconazole 2% cream. Apply twice daily to the face for 1 week for flares.    Seborrheic dermatitis       ketoconazole 2 % cream    NIZORAL    30 g    Mix with hydrocortisone 2.5% cream. Apply twice daily to the face for 1 week for flares    Seborrheic dermatitis       SERTRALINE HCL PO      Take 100 mg by mouth daily        triamterene-hydrochlorothiazide 37.5-25 MG per capsule    DYAZIDE    30 capsule    Take 1 capsule by mouth daily    Tinnitus aurium, right

## 2018-02-14 NOTE — MR AVS SNAPSHOT
After Visit Summary   2018    Deacon Torres    MRN: [id number]           Patient Information     Date Of Birth          1960        Visit Information        Provider Department      2018 10:30 AM Emily Antunez AuD;  LEOLA ABR MACHINE 1 [facility] Audiology        Today's Diagnoses     Sensorineural hearing loss (SNHL) of both ears    -  1       Follow-ups after your visit        Who to contact     Please call your clinic at [phone] to:    Ask questions about your health    Make or cancel appointments    Discuss your medicines    Learn about your test results    Speak to your doctor            Additional Information About Your Visit        MyChart Information     [employer] is an electronic gateway that provides easy, online access to your medical records. With [employer], you can request a clinic appointment, read your test results, renew a prescription or communicate with your care team.     To sign up for [employer] visit the website at [email or web address]   You will be asked to enter the access code listed below, as well as some personal information. Please follow the directions to create your username and password.     Your access code is: [email or web address]  Expires: 3/13/2018  6:30 AM     Your access code will  in 90 days. If you need help or a new code, please contact your [facility] or call [phone] for assistance.        Care EveryWhere ID     This is your Care EveryWhere ID. This could be used by other organizations to access your [place] medical records  [id number]         Blood Pressure from Last 3 Encounters:   No data found for BP    Weight from Last 3 Encounters:   18 90.3 kg (199 lb)   17 90.3 kg (199 lb)              We Performed the Following     Electrocochleography (16267)     Tympanometry (impedance - testing) (25501)        Primary Care Provider Office Phone # Fax #    Alexi Watts [phone]  [phone]       [address] [address]        Equal Access to Services     ROSANGELA ESCOBEDO : Hadii aad ku hadmaryhaydee Meseretmonik, wanadineda luhuydianeha, qaybta kashantanuda tomasaivy, waxafrica torresin hayaalory randolphcoco villated aria bocanegra. So [facility] [phone].    ATENCIÓN: Si habla español, tiene a horton disposición servicios gratuitos de asistencia lingüística. Llame al [phone].    We comply with applicable federal civil rights laws and [place] laws. We do not discriminate on the basis of race, color, national origin, age, disability, sex, sexual orientation, or gender identity.            Thank you!     Thank you for choosing [facility] AUDIOLOGY  for your care. Our goal is always to provide you with excellent care. Hearing back from our patients is one way we can continue to improve our services. Please take a few minutes to complete the written survey that you may receive in the mail after your visit with us. Thank you!             Your Updated Medication List - Protect others around you: Learn how to safely use, store and throw away your medicines at www.disposemymeds.org.          This list is accurate as of 2/14/18  2:20 PM.  Always use your most recent med list.                   Brand Name Dispense Instructions for use Diagnosis    hydrocortisone 2.5 % cream     30 g    Mix with ketoconazole 2% cream. Apply twice daily to the face for 1 week for flares.    Seborrheic dermatitis       ketoconazole 2 % cream    NIZORAL    30 g    Mix with hydrocortisone 2.5% cream. Apply twice daily to the face for 1 week for flares    Seborrheic dermatitis       SERTRALINE HCL PO      Take 100 mg by mouth daily

## 2018-02-14 NOTE — NURSING NOTE
Chief Complaint   Patient presents with     RECHECK     follow up     Marquita Jones Medical Assistant

## 2018-02-14 NOTE — LETTER
2/14/2018      RE: Deacon Torres  [address]  [place] [address]       History of Present Illness:  This is a 57-year-old man seen in follow-up. On review of his records, he is here for a discussion concerning the laboratory studies.    His symptoms began when he awoke from an anesthetic procedure done for nasal septal surgery. After this operation, he had tinnitus and aural fullness as well as pain in his right ear. Since that time, his symptoms have been unremitting and extremely bothersome.    Medical Record Review:  A more careful review of his records shows that he has a right sensorineural hearing loss which is slightly asymmetric in the low frequencies and has a significant high-frequency component.    Laboratory Studies: I asked for a cVEMP, ECOG and high-resolution CT scan. On review of the data, the left ear appears to have an abnormally low cVEMP threshold. I had originally thought that the cVEMP abnormality was on the same side as symptoms, but on review of the tracings, the threshold change is in the opposite LEFT ear.The ECOG is borderline abnormal on the left side and normal on the right. This borderline anomaly suggests hydrops with an elevated SP/AP ratio. The high-resolution CT scan was reviewed again and it shows no evidence for a superior semicircular canal dehiscence on either side. A review of the labyrinth shows that no other semicircular canal appears to be involved either.    Impression:  Although there is a hearing loss in the right ear which may be connected with the symptom of tinnitus, the lab abnormalities suggest that the left ear is abnormal. 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    Discussion/Plan:  Today we discussed the issues that I found and the patient is anxious about doing something to improve his condition.     I have recommended that he use a hearing aid/masker and that we try a diuretic for a period of time before  contemplating anything further. I warned him that exploratory tympanotomy of the left side which may (or may not) reveal the perilymphatic fistula may not give him relief of the symptoms. He wants to get another opinion and I have agreed to forward this information to one of my colleagues at [facility]. Perhaps they will be able to give him better advice. He also requested that Dr. Thao Olson review this here at the [facility].      CINDY/ms Homero Bartlett MD

## 2018-02-14 NOTE — PATIENT INSTRUCTIONS
You will have a consult in audiology for masking device.  Please  the prescription at the pharmacy for dyazide.  Dr. Bartlett will write a letter to [place]- I will let you know when this is done.   Please call our clinic for any questions,concerns,or worsening symptoms.      Clinic #[phone]       Option 3  for the triage nurse.

## 2018-02-14 NOTE — NURSING NOTE
Pt would like [facility] referral sent to insurance- preferred one [employer] fax [phone], phone [phone].  Reviewed basic purpose/side effects of dyazide-pt /wife understood.

## 2018-02-16 ENCOUNTER — TELEPHONE (OUTPATIENT)
Dept: OTOLARYNGOLOGY | Facility: CLINIC | Age: 58
End: 2018-02-16

## 2018-02-16 NOTE — TELEPHONE ENCOUNTER
The patient called to report that he took his 1st does of Dyazide yesterday AM and at about 1:30 when got up to go to the bathroom he reports that he had a syncope episode. He did not get hurt and kind of fell onto a .   As Dr Bartlett was in clinic today this incident was reviewed with him and his recommendations were to stay on the med and let us know if it happens again. Bill was OK with this plan.   Teodoro Iniguez ,RN  [phone]

## 2018-02-19 ENCOUNTER — CARE COORDINATION (OUTPATIENT)
Dept: OTOLARYNGOLOGY | Facility: CLINIC | Age: 58
End: 2018-02-19

## 2018-02-19 NOTE — PROGRESS NOTES
Pt notified that referral was sent to [facility].   The syncope episode may have been related to a virus. Over the w/e he had chills/fever/ache muscles. He is feeling fine today and has been able to take the dyazide without any difficulty.  Nohelia Elizalde RN  ENT Care Coordinator   Otology  [phone]  2/19/2018 11:36 AM

## 2018-04-23 ENCOUNTER — TRANSFERRED RECORDS (OUTPATIENT)
Dept: HEALTH INFORMATION MANAGEMENT | Facility: CLINIC | Age: 58
End: 2018-04-23

## 2021-04-09 ENCOUNTER — OFFICE VISIT (OUTPATIENT)
Dept: DERMATOLOGY | Facility: CLINIC | Age: 61
End: 2021-04-09
Payer: COMMERCIAL

## 2021-04-09 DIAGNOSIS — Z86.018 HISTORY OF DYSPLASTIC NEVUS: Primary | ICD-10-CM

## 2021-04-09 DIAGNOSIS — R21 RASH: ICD-10-CM

## 2021-04-09 DIAGNOSIS — L82.1 SK (SEBORRHEIC KERATOSIS): ICD-10-CM

## 2021-04-09 DIAGNOSIS — L57.0 AK (ACTINIC KERATOSIS): ICD-10-CM

## 2021-04-09 DIAGNOSIS — D48.5 NEOPLASM OF UNCERTAIN BEHAVIOR OF SKIN: ICD-10-CM

## 2021-04-09 PROCEDURE — 86038 ANTINUCLEAR ANTIBODIES: CPT | Performed by: DERMATOLOGY

## 2021-04-09 PROCEDURE — 17000 DESTRUCT PREMALG LESION: CPT | Mod: XS | Performed by: DERMATOLOGY

## 2021-04-09 PROCEDURE — 86039 ANTINUCLEAR ANTIBODIES (ANA): CPT | Performed by: DERMATOLOGY

## 2021-04-09 PROCEDURE — 36415 COLL VENOUS BLD VENIPUNCTURE: CPT | Performed by: DERMATOLOGY

## 2021-04-09 PROCEDURE — 88342 IMHCHEM/IMCYTCHM 1ST ANTB: CPT | Performed by: PATHOLOGY

## 2021-04-09 PROCEDURE — 17003 DESTRUCT PREMALG LES 2-14: CPT | Performed by: DERMATOLOGY

## 2021-04-09 PROCEDURE — 11102 TANGNTL BX SKIN SINGLE LES: CPT | Performed by: DERMATOLOGY

## 2021-04-09 PROCEDURE — 99204 OFFICE O/P NEW MOD 45 MIN: CPT | Mod: 25 | Performed by: DERMATOLOGY

## 2021-04-09 PROCEDURE — 88305 TISSUE EXAM BY PATHOLOGIST: CPT | Performed by: PATHOLOGY

## 2021-04-09 RX ORDER — CLONAZEPAM 0.5 MG/1
0.5 TABLET ORAL
COMMUNITY

## 2021-04-09 RX ORDER — FLUOROURACIL 50 MG/G
CREAM TOPICAL
Qty: 40 G | Refills: 0 | Status: SHIPPED | OUTPATIENT
Start: 2021-04-09 | End: 2021-12-07

## 2021-04-09 RX ORDER — MIRTAZAPINE 15 MG/1
15 TABLET, FILM COATED ORAL AT BEDTIME
COMMUNITY

## 2021-04-09 RX ORDER — HYDROCORTISONE 2.5 %
CREAM (GRAM) TOPICAL
Qty: 30 G | Refills: 0 | Status: SHIPPED | OUTPATIENT
Start: 2021-04-09 | End: 2021-12-07

## 2021-04-09 ASSESSMENT — PAIN SCALES - GENERAL: PAINLEVEL: NO PAIN (0)

## 2021-04-09 NOTE — NURSING NOTE
The following medication was given:     MEDICATION:  Lidocaine with epinephrine 1% [id number]  ROUTE: SQ  SITE: see procedure note  DOSE: 0.5 ml  LOT #: -EV  : Andrea  EXPIRATION DATE: 2/1/22  NDC#: 0472-4903-72  Was there drug waste? no  Multi-dose vial: Yes    Tova Scott RN  April 9, 2021

## 2021-04-09 NOTE — NURSING NOTE
Deacon Torres's goals for this visit include:   Chief Complaint   Patient presents with     Skin Check     Full body skin exam. Itchy area on upper back x 6+ months.         He requests these members of his care team be copied on today's visit information: Alexi Watts    PCP: Alexi Watts    Referring Provider:  Referred Self, MD  No address on file    There were no vitals taken for this visit.    Do you need any medication refills at today's visit? No.  Tova Scott RN

## 2021-04-09 NOTE — LETTER
4/9/2021         RE: Deacon Torres  [address]  [place] [address]        Dear Colleague,    Thank you for referring your patient, Deacon Torres, to the [facility]. Please see a copy of my visit note below.    [facility] Dermatology Note  Encounter Date: Apr 9, 2021  Office Visit     Dermatology Problem List:  1. Compound nevus with moderate dysplasia, right lateral ankle  -s/p biopsy 7/18/2016  2. Actinic keratosis  -Efudex initiated 4/9/21  -s/p cryotherapy  3. Erythematous scaly patch on the bilateral cheeks  -KATHRINE ordered  -hydrocortisone 2.5% cream BID x 2 weeks  4. NUB - right mid back - bx 4/9/21     ____________________________________________    Assessment & Plan:    # History of DN: No evidence of recurrence. While these are benign, they are a marker for increased melanoma risk.  - Recommended yearly skin checks.    # Seborrheic keratosis, non irritated.    - No further intervention needed.     # Actinic keratosis - R temple, L lower cheek, L malar cheek. Will start Efudex on the L malar cheek and treat other AKs with cryo.  - Start Efudex BID on the L malar cheek for 2-4 weeks or until the onset of irritation. Anticipated reaction discussed.. Recommend washing hands after use and/or using gloves to apply, keeping medication away from pets, and avoiding sun exposure to treated area.    - See procedure note.     # Erythematous scaly patch on the bilateral cheeks. Does involve the nasolabial folds as well. Patient reports possible history of lupus. Could be seb derm  -KATHRINE ordered today  -Start hydrocrotisone 2.5% cream BID x 2 weeks  -use sunscreen    # Neoplasm of uncertain behavior on the right mid back. The differential diagnosis includes SK vs nevus. .   - See procedure note.       Procedures Performed:   -Cryotherapy procedure note: After verbal consent and discussion of risks and benefits including but no limited to dyspigmentation/scar,  blister, and pain, 2 AKs was(were) treated with 1-2mm freeze border for 2 cycles with liquid nitrogen. Post cryotherapy instructions were provided.   -Shave biopsy: Location(s) right mid back.  After discussion of benefits and risks including but not limited to bleeding/bruising, pain/swelling, infection, scar, incomplete removal, nerve damage/numbness, recurrence, and non-diagnostic biopsy, written consent, verbal consent and photographs were obtained. Time-out was performed. The area was cleaned with isopropyl alcohol. 0.5mL of 1% lidocaine with epinephrine was injected to obtain adequate anesthesia of each lesion. Shave biopsy was performed. Hemostasis was achieved with aluminium chloride. Vaseline and a sterile dressing were applied. The patient tolerated the procedure and no complications were noted. The patient was provided with verbal and written post care instructions.       Follow-up: 2-3 weeks for recheck of facial rash, earlier for new or changing lesions    Staff and Scribe:     Scribe Disclosure:   I, Reji Garcia, am serving as a scribe to document services personally performed by this physician, Dr. Barbara Vogt, based on data collection and the provider's statements to me.     Provider Disclosure:   The documentation recorded by the scribe accurately reflects the services I personally performed and the decisions made by me.    Barbara Vogt MD    Department of Dermatology  [facility]: Phone: [phone], Fax:[phone]  [facility]: Phone: [phone], Fax: [phone]      ____________________________________________    CC: Skin Check (Full body skin exam. Itchy area on upper back x 6+ months.  )    HPI:  Mr. Deacon Torres is a(n) 60 year old male who presents today as a new patient for a skin check.    Last seen by derm in 2017 by Dr. Rocha. Patient had an AK and  filiform wart treated with cryo. Patient was also started on ketoconazole mixed with hydrocortisone cream twice daily x 1 week for flares of seborrheic dermatitis in the nasolabial folds.    Today, he notes an itchy area on the right shoulder that has been present for 6 months. He also notes a previous possible history of lupus, although he is uncertain. He is unable to recall having an KATHRINE checked in the past. Denies any rash when in the sun.    Patient is otherwise feeling well, without additional skin concerns.    Labs Reviewed:  N/A    Physical Exam:  Vitals: There were no vitals taken for this visit.  SKIN: Total skin excluding the undergarment areas was performed. The exam included the head/face, neck, both arms, chest, back, abdomen, both legs, digits and/or nails. Declines buttocks or genital exam.  - Well healed scar on the R lateral ankle. No re pigmentation.  - There are waxy stuck on tan to brown papules on the right frontal chest.   - There is an erythematous macule with overyling adherent scale on the L lower cheek, L malar cheek, R temple  - Erythematous scaly patch on the bilateral cheeks. Does involve the nasolabial folds as well.  - Sutures are in place on the L shoulder.  - Right Mid back: 3 mm pigmented papule. Pigmentation is darker distally  - No other lesions of concern on areas examined.     Medications:  Current Outpatient Medications   Medication     hydrocortisone 2.5 % cream     ketoconazole (NIZORAL) 2 % cream     SERTRALINE HCL PO     triamterene-hydrochlorothiazide (DYAZIDE) 37.5-25 MG per capsule     No current facility-administered medications for this visit.       Past Medical History:   Patient Active Problem List   Diagnosis     Otalgia, right     Past Medical History:   Diagnosis Date     Carpal tunnel syndrome      Depression      Depressive disorder      Gastroesophageal reflux disease      Migraines      Tinnitus         CC Referred Self, MD  No address on file on close of  this encounter.      Again, thank you for allowing me to participate in the care of your patient.        Sincerely,        Barbara Vogt MD

## 2021-04-09 NOTE — PATIENT INSTRUCTIONS
Efudex Treatment    Today, you are being prescribed Fluorouracil (Efudex) a topical cream used for the treatment of Actinic Keratosis (AK's).  The medication is working to eliminate the unhealthy cells. Even though this treatment may be unattractive and somewhat uncomfortable.    Your treatment will last twice daily for 3 weeks on the left cheek.  You may experience some mild discomfort while being treated.    You will want to stop any other creams such as glycolic acid products, retin A, Tazorac, etc. to the area. You may use bland makeup/cover-up as long as it doesn't sting or cause you discomfort.    Apply the cream at night as your physician recommends. Use a cotton-tipped applicator, or use gloves if applying it with your fingertips. If applied with unprotected fingertips, it is important to wash your hands well after you apply this medicine.     Keep this medication away from pets.    We recommend avoiding excessive sun exposure to the treated area    You may use moisturizing creams over bothersome areas such as Vanicream or Cetaphil cream if the reaction becomes too bothersome. Please, call the clinic if this occurs.   Potential Side Effects    Your treated areas may be unsightly during therapy.  This will improve slowly following the discontinuation of therapy.     During the first week of application, mild inflammation may occur.     During the following weeks, redness, and swelling may occur with some crusting and burning.     Lesions resolve as the skin exfoliates.     Over 1 to 2 weeks, new skin grows into the treatment area.    Keep this medication away from pets  Specific side effects that usually do not require medical attention (report to your doctor or health care professional if they continue or are bothersome) include: Red or dark-colored skin     Mild erosion (loss of upper layer of skin)     Mild eye irritation including burning, itching, sensitivity, stinging, or watering     Increased  sensitivity of the skin to sun and ultraviolet light     Pain and burning of the affected area     Dryness, scaling or swelling of the affected area     Skin rash, itching of the affected area     Tenderness     If you have severe pain, please, call the clinic immediately and indicate that you have pain. Ask for a call from the RN.   Who should I call with questions?     [facility]: [phone]     [facility]: [phone]     For urgent needs outside of business hours call the [facility] [[email or web address]] at [phone]  and ask for the dermatology resident on call    Cryotherapy    What is it?    Use of a very cold liquid, such as liquid nitrogen, to freeze and destroy abnormal skin cells that need to be removed    What should I expect?    Tenderness and redness    A small blister that might grow and fill with dark purple blood. There may be crusting.    More than one treatment may be needed if the lesions do not go away.    How do I care for the treated area?    Gently wash the area with your hands when bathing.    Use a thin layer of Vaseline to help with healing. You may use a Band-Aid.     The area should heal within 7-10 days and may leave behind a pink or lighter color.     Do not use an antibiotic or Neosporin ointment.     You may take acetaminophen (Tylenol) for pain.     Call your Doctor if you have:    Severe pain    Signs of infection (warmth, redness, cloudy yellow drainage, and or a bad smell)    Questions or concerns    Who should I call with questions?       [facility]: [phone]       [facility]: [phone]       For urgent needs outside of business hours call the [facility] [[email or web address]] at [phone]        and ask for the dermatology resident on call      Wound Care After a Biopsy    What is a skin biopsy?  A skin biopsy allows the doctor to examine a very small piece of tissue  under the microscope to determine the diagnosis and the best treatment for the skin condition. A local anesthetic (numbing medicine)  is injected with a very small needle into the skin area to be tested. A small piece of skin is taken from the area. Sometimes a suture (stitch) is used.     What are the risks of a skin biopsy?  I will experience scar, bleeding, swelling, pain, crusting and redness. I may experience incomplete removal or recurrence. Risks of this procedure are excessive bleeding, bruising, infection, nerve damage, numbness, thick (hypertrophic or keloidal) scar and non-diagnostic biopsy.    How should I care for my wound for the first 24 hours?    Keep the wound dry and covered for 24 hours    If it bleeds, hold direct pressure on the area for 15 minutes. If bleeding does not stop then go to the emergency room    Avoid strenuous exercise the first 1-2 days or as your doctor instructs you    How should I care for the wound after 24 hours?    After 24 hours, remove the bandage    You may bathe or shower as normal    If you had a scalp biopsy, you can shampoo as usual and can use shower water to clean the biopsy site daily    Clean the wound twice a day with gentle soap and water    Do not scrub, be gentle    Apply white petroleum/Vaseline after cleaning the wound with a cotton swab or a clean finger, and keep the site covered with a Bandaid /bandage. Bandages are not necessary with a scalp biopsy    If you are unable to cover the site with a Bandaid /bandage, re-apply ointment 2-3 times a day to keep the site moist. Moisture will help with healing    Avoid strenuous activity for first 1-2 days    Avoid lakes, rivers, pools, and oceans until the stitches are removed or the site is healed    How do I clean my wound?    Wash hands thoroughly with soap or use hand  before all wound care    Clean the wound with gentle soap and water    Apply white petroleum/Vaseline  to wound after it is  clean    Replace the Bandaid /bandage to keep the wound covered for the first few days or as instructed by your doctor    If you had a scalp biopsy, warm shower water to the area on a daily basis should suffice    What should I use to clean my wound?     Cotton-tipped applicators (Qtips )    White petroleum jelly (Vaseline ). Use a clean new container and use Q-tips to apply.    Bandaids   as needed    Gentle soap     How should I care for my wound long term?    Do not get your wound dirty    Keep up with wound care for one week or until the area is healed.    A small scab will form and fall off by itself when the area is completely healed. The area will be red and will become pink in color as it heals. Sun protection is very important for how your scar will turn out. Sunscreen with an SPF 30 or greater is recommended once the area is healed.    You should have some soreness but it should be mild and slowly go away over several days. Talk to your doctor about using tylenol for pain,    When should I call my doctor?  If you have increased:     Pain or swelling    Pus or drainage (clear or slightly yellow drainage is ok)    Temperature over 100F    Spreading redness or warmth around wound    When will I hear about my results?  The biopsy results can take 2-3 weeks to come back. The clinic will call you with the results, send you a [facility] message, or have you schedule a follow-up clinic or phone time to discuss the results. Contact our clinics if you do not hear from us in 3 weeks.     Who should I call with questions?    [facility]: [phone]     [facility]: [phone]    For urgent needs outside of business hours call the [facility] at [phone] and ask for the dermatology resident on call

## 2021-04-09 NOTE — PROGRESS NOTES
[facility] Dermatology Note  Encounter Date: Apr 9, 2021  Office Visit     Dermatology Problem List:  1. Compound nevus with moderate dysplasia, right lateral ankle  -s/p biopsy 7/18/2016  2. Actinic keratosis  -Efudex initiated 4/9/21  -s/p cryotherapy  3. Erythematous scaly patch on the bilateral cheeks  -KATHRINE ordered  -hydrocortisone 2.5% cream BID x 2 weeks  4. NUB - right mid back - bx 4/9/21     ____________________________________________    Assessment & Plan:    # History of DN: No evidence of recurrence. While these are benign, they are a marker for increased melanoma risk.  - Recommended yearly skin checks.    # Seborrheic keratosis, non irritated.    - No further intervention needed.     # Actinic keratosis - R temple, L lower cheek, L malar cheek. Will start Efudex on the L malar cheek and treat other AKs with cryo.  - Start Efudex BID on the L malar cheek for 2-4 weeks or until the onset of irritation. Anticipated reaction discussed.. Recommend washing hands after use and/or using gloves to apply, keeping medication away from pets, and avoiding sun exposure to treated area.    - See procedure note.     # Erythematous scaly patch on the bilateral cheeks. Does involve the nasolabial folds as well. Patient reports possible history of lupus. Could be seb derm  -KATHRINE ordered today  -Start hydrocrotisone 2.5% cream BID x 2 weeks  -use sunscreen    # Neoplasm of uncertain behavior on the right mid back. The differential diagnosis includes SK vs nevus. .   - See procedure note.       Procedures Performed:   -Cryotherapy procedure note: After verbal consent and discussion of risks and benefits including but no limited to dyspigmentation/scar, blister, and pain, 2 AKs was(were) treated with 1-2mm freeze border for 2 cycles with liquid nitrogen. Post cryotherapy instructions were provided.   -Shave biopsy: Location(s) right mid back.  After discussion of benefits and risks including but not  limited to bleeding/bruising, pain/swelling, infection, scar, incomplete removal, nerve damage/numbness, recurrence, and non-diagnostic biopsy, written consent, verbal consent and photographs were obtained. Time-out was performed. The area was cleaned with isopropyl alcohol. 0.5mL of 1% lidocaine with epinephrine was injected to obtain adequate anesthesia of each lesion. Shave biopsy was performed. Hemostasis was achieved with aluminium chloride. Vaseline and a sterile dressing were applied. The patient tolerated the procedure and no complications were noted. The patient was provided with verbal and written post care instructions.       Follow-up: 2-3 weeks for recheck of facial rash, earlier for new or changing lesions    Staff and Scribe:     Scribe Disclosure:   I, Reji Garcia, am serving as a scribe to document services personally performed by this physician, Dr. Barbara Vogt, based on data collection and the provider's statements to me.     Provider Disclosure:   The documentation recorded by the scribe accurately reflects the services I personally performed and the decisions made by me.    Barbara Vogt MD    Department of Dermatology  [facility]: Phone: [phone], Fax:[phone]  [facility]: Phone: [phone], Fax: [phone]      ____________________________________________    CC: Skin Check (Full body skin exam. Itchy area on upper back x 6+ months.  )    HPI:  Mr. Deacon Torres is a(n) 60 year old male who presents today as a new patient for a skin check.    Last seen by derm in 2017 by Dr. Rocha. Patient had an AK and filiform wart treated with cryo. Patient was also started on ketoconazole mixed with hydrocortisone cream twice daily x 1 week for flares of seborrheic dermatitis in the nasolabial folds.    Today, he notes an itchy area on the right shoulder that has  been present for 6 months. He also notes a previous possible history of lupus, although he is uncertain. He is unable to recall having an KATHRINE checked in the past. Denies any rash when in the sun.    Patient is otherwise feeling well, without additional skin concerns.    Labs Reviewed:  N/A    Physical Exam:  Vitals: There were no vitals taken for this visit.  SKIN: Total skin excluding the undergarment areas was performed. The exam included the head/face, neck, both arms, chest, back, abdomen, both legs, digits and/or nails. Declines buttocks or genital exam.  - Well healed scar on the R lateral ankle. No re pigmentation.  - There are waxy stuck on tan to brown papules on the right frontal chest.   - There is an erythematous macule with overyling adherent scale on the L lower cheek, L malar cheek, R temple  - Erythematous scaly patch on the bilateral cheeks. Does involve the nasolabial folds as well.  - Sutures are in place on the L shoulder.  - Right Mid back: 3 mm pigmented papule. Pigmentation is darker distally  - No other lesions of concern on areas examined.     Medications:  Current Outpatient Medications   Medication     hydrocortisone 2.5 % cream     ketoconazole (NIZORAL) 2 % cream     SERTRALINE HCL PO     triamterene-hydrochlorothiazide (DYAZIDE) 37.5-25 MG per capsule     No current facility-administered medications for this visit.       Past Medical History:   Patient Active Problem List   Diagnosis     Otalgia, right     Past Medical History:   Diagnosis Date     Carpal tunnel syndrome      Depression      Depressive disorder      Gastroesophageal reflux disease      Migraines      Tinnitus         CC Referred Self, MD  No address on file on close of this encounter.

## 2021-04-12 LAB
ANA PAT SER IF-IMP: ABNORMAL
ANA SER QL IF: ABNORMAL
ANA TITR SER IF: ABNORMAL {TITER}

## 2021-04-16 ENCOUNTER — TELEPHONE (OUTPATIENT)
Dept: DERMATOLOGY | Facility: CLINIC | Age: 61
End: 2021-04-16

## 2021-04-16 DIAGNOSIS — R76.8 POSITIVE ANA (ANTINUCLEAR ANTIBODY): Primary | ICD-10-CM

## 2021-04-16 NOTE — TELEPHONE ENCOUNTER
Routing to Team coordinating scheduling team to please assist with scheduling an appointment with Rheumatology. If appt is more than a week out please route back to team with this update so additional labs can be ordered. Thank you...Barbara Sargent RN, MD [facility] Dermatology Adult [place]             Please, call patient. Needs to see rheumatology for positive KATHRINE (needs rule out lupus). If appt is more than 1 week out then go ahead and order labs too: l obtain CBC with diff, UA, BMP, Alt/ast, BUN/CR,   C3 and C4, anti-ro, anti-histone, anti dbl stranded DNA, ESR. Recommend initiation of SPF 30 or more sunscreen with titanium dioxide or zinc oxide. Patient offered initiation of topical steroid versus po steroids. Plaquenil may also be considered.      Associated Results    Contains abnormal data Anti Nuclear Serina IgG by IFA with Reflex  Order: [id number]  Status:  Edited Result - FINAL   Visible to patient:  No (inaccessible in MyChart) Dx:  Rash   Ref Range & Units 7d ago   KATHRINE interpretation NEG^Negative Borderline PositiveAbnormal     Comment:                                    Reference range:   <1:40  NEGATIVE   1:40 - 1:80  BORDERLINE POSITIVE   >1:80 POSITIVE    KATHRINE pattern 1  SPECKLED    KATHRINE titer 1  1:40    Resulting Agency  [facility]         Specimen Collected: 04/09/21 11:12 AM Last Resulted: 04/12/21 12:22 PM         Lab Flowsheet      Order Details      View Encounter      Lab and Collection Details      Routing      Result History           Result Communications      Result Notes     Alejandra Khan RN   4/16/2021 11:17 AM CDT      Pt returned the call and notified of results and 's recommendation. . Pt notified that RN will route a message to the  scheduling team to assist with scheduling an appointment and if appt is more than a week out RN will notify  to update derm team so additional labs can be  ordered . Pt verbalized understanding and had no further questions....Tova Dumont RN, RN   4/14/2021  2:21 PM CDT      I left a message for patient to call [facility].  Please schedule lab only appt and flag for RN to place lab orders and rheum referral.    Tova Scott RN

## 2021-04-16 NOTE — TELEPHONE ENCOUNTER
Routing this to Scheduling Adult Rheumatology to contact patient to schedule.     Per derm clinic RN:    Routing to Team coordinating scheduling team to please assist with scheduling an appointment with Rheumatology. If appt is more than a week out please route back to team with this update so additional labs can be ordered. Thank you...Alejandra Russ  Surgical Specialties Procedure   [facility] [place]  4/16/2021 11:27 AM

## 2021-04-19 LAB — COPATH REPORT: NORMAL

## 2021-04-19 NOTE — TELEPHONE ENCOUNTER
Pt called at this time, states he has not heard from anyone in scheduled. Routing to the rheum team to reach out to pt for scheduling.     Adriana Genao LPN on 4/19/2021 at 2:29 PM

## 2021-04-19 NOTE — TELEPHONE ENCOUNTER
Now that referral is in, routed to rheumatology scheduling pool to schedule patient.      Alejandra Russ  Surgical Specialties Procedure   [facility] [place]  4/19/2021 10:42 AM

## 2021-04-19 NOTE — TELEPHONE ENCOUNTER
Patient has been scheduled.      Alejandra Russ  Surgical Specialties Procedure   [facility] [place]  11/17/2020 8:33 AM

## 2021-04-23 NOTE — PROGRESS NOTES
"Deacon is a 60 year old who is being evaluated via a billable video visit.      How would you like to obtain your AVS? {AVS Preference:028410}  If the video visit is dropped, the invitation should be resent by: {video visit invitation:922528}  Will anyone else be joining your video visit? {:990266}  {If patient encounters technical issues they should call [phone] :599303}    Video Start Time: {video visit start/end time for provider to select:128398}  Video-Visit Details    Type of service:  Video Visit    Video End Time:{video visit start/end time for provider to select:072649}    Originating Location (pt. Location): {video visit patient location:065363::\"Home\"}    Distant Location (provider location):  [facility]     Platform used for Video Visit: {Virtual Visit Platforms:189510::\"apartum\"}    "

## 2021-04-27 NOTE — PROGRESS NOTES
RHEUMATOLOGY INITIAL VIDEO CONSULT NOTE    Chief Complaint:    Chief Complaint   Patient presents with     Consult     positive KATHRINE blood work, labs completed on 4/9/2021 by dermatologist       Reason for consult: Dr. Vogt from dermatology has requested consultation for this patient for KATHRINE 1:40    Video visit may not be as thorough as an in-person visit and physical exam limitations may pose a challenge in formulating an accurate diagnosis.       History of Present Illness:    Deacon Torres is a 60 year old male with pmhx depression, CTS s/p release bilaterally, actinic keratosis, GERD, migraines, L rotator cuff tear s/p repair, referred to rheumatology clinic for +KATHRINE 1:40.     Has hx of erythematous scaly patch over b/l cheeks. Pt reports having this rash since childhood. Rash comes and goes. He feels the sun exposure brings the rash on. The rash self-resolves after several days- can last a full month. Denies any tenderness or blistering of the rash. He denies any other rashes. He was seen by dermatology Dr Vogt and had an KATHRINE antibody checked. Per pt, he has not been told whether his facial rash is a lupus rash. Per dermatology notes, rash involves the nasolabial fold. He was given topical hydrocortisone cream which he has been using and feels it has helped the rash. He reports some hand pain off and on with activity that he has had for many years. +Reports stiffness in hands that can last all day long, worse with activity. Denies any hand joint swelling. Reports having extensive family hx of osteoarthritis. He does not use anything for pain. CTS symptoms have resolved since having bilateral release.    Denies fevers, chills, weight loss, night sweats, vision changes, eye pain/redness, dry eyes, dry mouth, oral/nasal ulcers, hair loss/thinning, raynaud's, cough, SOB, pleurisy, chest pain, edema, heartburn, difficulty swallowing, abdominal pain, diarrhea, hematochezia, melena, dysuria, recurrent genital  ulcers, back pain, enthesitis, dactylitis, numbness, weakness, tingling. No hx of IBD. No hx of blood clots.     Pertinent labs and imaging: (per chart review in [facility] and care everywhere)    Labs:   4/9/21: KATHRINE 1:40    Past Medical History:    Past Medical History:   Diagnosis Date     Actinic keratosis      Carpal tunnel syndrome      Depression      Depressive disorder      Gastroesophageal reflux disease      Migraines      Tinnitus      Past Surgical History:   Past Surgical History:   Procedure Laterality Date     ADENOIDECTOMY       ENT SURGERY       GI SURGERY       TONSILLECTOMY       Family History:   Denies family hx of RA, SLE, Sjogren's syndrome, scleroderma, PsA, ankylosing spondylitis, psoriasis, vasculitis, gout, pseudogout.    Distant cousin: lupus  Parents and grandparents: unknown type of arthritis    Social History:   Alcohol use: on average, 2 drinks/week  Tobacco use: never  Occupational hx: currently on PTSD leave, works as a paramedic    Allergies   Allergen Reactions     Codeine Itching      Immunization History   Administered Date(s) Administered     COVID-19,PF,Pfizer 01/24/2021, 02/11/2021     Flu, Unspecified 10/22/2020     Td (Adult), Adsorbed 01/01/1999     Tdap (Adult) Unspecified Formulation 12/16/2015          Medications:  Current Outpatient Medications   Medication Sig Dispense Refill     clonazePAM (KLONOPIN) 0.5 MG tablet Take 0.5 mg by mouth nightly as needed for anxiety       fluorouracil (EFUDEX) 5 % external cream Apply to lesion on left cheek twice daily for 3 weeks.  Wash hands after applying. 40 g 0     hydrocortisone 2.5 % cream Apply twice daily for 2 weeks to mid cheeks 30 g 0     mirtazapine (REMERON) 15 MG tablet Take 15 mg by mouth At Bedtime       SERTRALINE HCL PO Take 100 mg by mouth daily       PHYSICAL EXAMINATION: (limited as pt was evaluated via video visit)  Vital signs: (not taken due to evaluation via video visit)    Skin: no erythematous facial rash  Pulm:  non-labored respirations, no conversational dyspnea  MSK: no hand joint swelling, able to make a fist b/l, wrist ROM intact b/l      Labs:      I have reviewed all pertinent investigations including labs, including outside records if relevant      KATHRINE  Recent Labs   Lab Test 04/09/21  1112   LULA Borderline Positive*   ANAP1 SPECKLED   ANAT1 1:40     Imaging:    I have reviewed all pertinent investigations including imaging, including outside records if relevant    MR L shoulder (2/11/21)  IMPRESSION:    1. Supraspinatus and infraspinatus tendinosis with high-grade partial articular surface tearing of the posterior insertion of the supraspinatus and anterior insertion of the infraspinatus at points becoming near full-thickness with very thin intact bursal surface fibers.  2. No definitive full-thickness rotator cuff tendon tear.  3. Small amount of fluid in the subacromial/subdeltoid bursa may be reactive versus mild bursitis.  4. Mild degenerative arthrosis of the glenohumeral joint with degenerative fraying of the superior and posterior superior labrum.  5. 10 x 7 mm full-thickness chondral defect of the posterior superior humeral head.  6. Moderate to advanced degenerative arthrosis of the acromioclavicular joint.    Assessment / Plan:    Deacon Torres is a 60 year old male with pmhx depression, CTS s/p release bilaterally, actinic keratosis, GERD, migraines, L rotator cuff tear s/p repair, referred to rheumatology clinic for +KATHRINE 1:40. KATHRINE ordered by Dr Vogt in dermatology for evaluation of erythematous facial rash. Any prior notes, outside records, laboratory results, and imaging studies were reviewed if relevant. Pertinent work-up thus far includes +KATHRINE 1:40. Pt does not have facial rash today. It is not clear whether this rash is felt to be a malar rash -per dermatology notes, rash involves the nasolabial folds which is not consistent with a malar rash. Pt reports having this rash since he was a child. Sun  exposure makes the rash worse, which could be seen with rosacea as well. Per pt, rash has typically self resolved in the past. He has recently been applying topical hydrocortisone cream from dermatology, which has helped the rash. He does not have any features of SLE -no mucocutaneous lesions, alopecia, pleurisy, raynaud's. 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 Has known hx of OA of shoulder and hips.     We discussed that a positive KATHRINE can be seen in a variety of different diseases and syndrome.  The anti-nuclear antibody is a screening test for auto-immune disease.  Depending on the level or titer, it may be seen in autoimmune diseases such as SLE, sjogren's, scleroderma but could also be seen in auto-immune hepatitis, hypothyroidism and various other disease.  A positive KATHRINE does not give one a conclusive diagnosis of SLE as it may be a false positive and may not be significant as well.  Further w/u for auto-immune disease can be considered based on clinical symptoms and other laboratory findings. KATHRINE of 1:40 can be seen in up to 30% of healthy population. An KATHRINE of at least 1:80 is needed per EULAR/ACR criteria for SLE classification. Pt does not meet entry criterion for SLE. Given uncertainty of the facial rash, we discussed we could obtain specific lupus antibodies. However, he does not meet criteria and I am not suspecting this is SLE.     1) B/l hand arthralgia, chronic  -check RF, CCP  -obtain plain films of b/l hands    2) KATHRINE 1:40 and erythematous facial rash of unclear etiology, per dermatology notes -involves the nasolabial folds  -pt referred by dermatology for evaluation of SLE  -check CBC w diff, CMP, BREANA, dsDNA, C3, C4, UA, UPC, Hep C      Mr. Torres verbalized agreement with and understanding of the rationale for the diagnosis and treatment plan.  All questions were  answered to best of my ability and the patient's satisfaction. Mr. Torres was advised to contact the clinic with any questions that may arise after the clinic visit.      Chart documentation done in part with Dragon Voice recognition Software. Although reviewed after completion, some word and grammatical error may remain.      RTC pending test results    Monik Fairchild MD

## 2021-04-28 ENCOUNTER — VIRTUAL VISIT (OUTPATIENT)
Dept: RHEUMATOLOGY | Facility: CLINIC | Age: 61
End: 2021-04-28
Attending: DERMATOLOGY
Payer: COMMERCIAL

## 2021-04-28 DIAGNOSIS — M25.541 ARTHRALGIA OF HANDS, BILATERAL: ICD-10-CM

## 2021-04-28 DIAGNOSIS — M25.542 ARTHRALGIA OF HANDS, BILATERAL: ICD-10-CM

## 2021-04-28 DIAGNOSIS — R76.8 POSITIVE ANA (ANTINUCLEAR ANTIBODY): Primary | ICD-10-CM

## 2021-04-28 PROCEDURE — 99204 OFFICE O/P NEW MOD 45 MIN: CPT | Mod: 95 | Performed by: STUDENT IN AN ORGANIZED HEALTH CARE EDUCATION/TRAINING PROGRAM

## 2021-04-28 RX ORDER — IBUPROFEN 600 MG/1
600 TABLET, FILM COATED ORAL PRN
COMMUNITY
Start: 2021-04-02 | End: 2021-12-07

## 2021-04-28 RX ORDER — ACETAMINOPHEN 325 MG/1
650 TABLET ORAL PRN
COMMUNITY
Start: 2021-04-02 | End: 2021-12-07

## 2021-04-28 NOTE — PROGRESS NOTES
Deacon is a 60 year old who is being evaluated via a billable video visit.      Video Start Time: 7:45  Video-Visit Details    Type of service:  Video Visit    Video End Time: 8:10    Originating Location (pt. Location): Home    Distant Location (provider location):  [facility]     Platform used for Video Visit: [employer]

## 2021-04-28 NOTE — PATIENT INSTRUCTIONS
-Please make an appointment for lab and x-rays at any [facility] location near you  -I will be in touch once all test results are back  -Follow-up pending test results

## 2021-04-29 ENCOUNTER — ANCILLARY PROCEDURE (OUTPATIENT)
Dept: GENERAL RADIOLOGY | Facility: CLINIC | Age: 61
End: 2021-04-29
Attending: STUDENT IN AN ORGANIZED HEALTH CARE EDUCATION/TRAINING PROGRAM
Payer: COMMERCIAL

## 2021-04-29 ENCOUNTER — VIRTUAL VISIT (OUTPATIENT)
Dept: DERMATOLOGY | Facility: CLINIC | Age: 61
End: 2021-04-29
Payer: COMMERCIAL

## 2021-04-29 DIAGNOSIS — L57.0 ACTINIC KERATOSIS: Primary | ICD-10-CM

## 2021-04-29 DIAGNOSIS — M25.541 ARTHRALGIA OF HANDS, BILATERAL: ICD-10-CM

## 2021-04-29 DIAGNOSIS — M25.542 ARTHRALGIA OF HANDS, BILATERAL: ICD-10-CM

## 2021-04-29 DIAGNOSIS — D22.9 ATYPICAL NEVUS: ICD-10-CM

## 2021-04-29 DIAGNOSIS — R76.8 POSITIVE ANA (ANTINUCLEAR ANTIBODY): ICD-10-CM

## 2021-04-29 LAB
ALBUMIN SERPL-MCNC: 3.5 G/DL (ref 3.4–5)
ALBUMIN UR-MCNC: NEGATIVE MG/DL
ALP SERPL-CCNC: 63 U/L (ref 40–150)
ALT SERPL W P-5'-P-CCNC: 26 U/L (ref 0–70)
ANION GAP SERPL CALCULATED.3IONS-SCNC: 4 MMOL/L (ref 3–14)
APPEARANCE UR: CLEAR
AST SERPL W P-5'-P-CCNC: 17 U/L (ref 0–45)
BASOPHILS # BLD AUTO: 0.1 10E9/L (ref 0–0.2)
BASOPHILS NFR BLD AUTO: 1.2 %
BILIRUB SERPL-MCNC: 0.6 MG/DL (ref 0.2–1.3)
BILIRUB UR QL STRIP: NEGATIVE
BUN SERPL-MCNC: 18 MG/DL (ref 7–30)
CALCIUM SERPL-MCNC: 8.7 MG/DL (ref 8.5–10.1)
CHLORIDE SERPL-SCNC: 107 MMOL/L (ref 94–109)
CO2 SERPL-SCNC: 26 MMOL/L (ref 20–32)
COLOR UR AUTO: YELLOW
CREAT SERPL-MCNC: 1.04 MG/DL (ref 0.66–1.25)
CREAT UR-MCNC: 155 MG/DL
DIFFERENTIAL METHOD BLD: NORMAL
ENA RNP IGG SER IA-ACNC: 0.2 AI (ref 0–0.9)
ENA SM IGG SER-ACNC: <0.2 AI (ref 0–0.9)
ENA SS-A IGG SER IA-ACNC: <0.2 AI (ref 0–0.9)
ENA SS-B IGG SER IA-ACNC: <0.2 AI (ref 0–0.9)
EOSINOPHIL # BLD AUTO: 0.3 10E9/L (ref 0–0.7)
EOSINOPHIL NFR BLD AUTO: 5.5 %
ERYTHROCYTE [DISTWIDTH] IN BLOOD BY AUTOMATED COUNT: 12.8 % (ref 10–15)
GFR SERPL CREATININE-BSD FRML MDRD: 79 ML/MIN/{1.73_M2}
GLUCOSE SERPL-MCNC: 91 MG/DL (ref 70–99)
GLUCOSE UR STRIP-MCNC: NEGATIVE MG/DL
HCT VFR BLD AUTO: 48.6 % (ref 40–53)
HCV AB SERPL QL IA: NONREACTIVE
HGB BLD-MCNC: 16.2 G/DL (ref 13.3–17.7)
HGB UR QL STRIP: NEGATIVE
IMM GRANULOCYTES # BLD: 0 10E9/L (ref 0–0.4)
IMM GRANULOCYTES NFR BLD: 0.3 %
KETONES UR STRIP-MCNC: NEGATIVE MG/DL
LEUKOCYTE ESTERASE UR QL STRIP: NEGATIVE
LYMPHOCYTES # BLD AUTO: 1.5 10E9/L (ref 0.8–5.3)
LYMPHOCYTES NFR BLD AUTO: 24.7 %
MCH RBC QN AUTO: 31.1 PG (ref 26.5–33)
MCHC RBC AUTO-ENTMCNC: 33.3 G/DL (ref 31.5–36.5)
MCV RBC AUTO: 93 FL (ref 78–100)
MONOCYTES # BLD AUTO: 0.7 10E9/L (ref 0–1.3)
MONOCYTES NFR BLD AUTO: 11.2 %
NEUTROPHILS # BLD AUTO: 3.4 10E9/L (ref 1.6–8.3)
NEUTROPHILS NFR BLD AUTO: 57.1 %
NITRATE UR QL: NEGATIVE
PH UR STRIP: 5.5 PH (ref 5–7)
PLATELET # BLD AUTO: 212 10E9/L (ref 150–450)
POTASSIUM SERPL-SCNC: 4.5 MMOL/L (ref 3.4–5.3)
PROT SERPL-MCNC: 6.8 G/DL (ref 6.8–8.8)
PROT UR-MCNC: 0.11 G/L
PROT/CREAT 24H UR: 0.07 G/G CR (ref 0–0.2)
RBC # BLD AUTO: 5.21 10E12/L (ref 4.4–5.9)
RBC #/AREA URNS AUTO: NORMAL /HPF
SODIUM SERPL-SCNC: 137 MMOL/L (ref 133–144)
SOURCE: NORMAL
SP GR UR STRIP: 1.02 (ref 1–1.03)
UROBILINOGEN UR STRIP-MCNC: NORMAL MG/DL (ref 0–2)
WBC # BLD AUTO: 6 10E9/L (ref 4–11)
WBC #/AREA URNS AUTO: NORMAL /HPF

## 2021-04-29 PROCEDURE — 36415 COLL VENOUS BLD VENIPUNCTURE: CPT | Performed by: STUDENT IN AN ORGANIZED HEALTH CARE EDUCATION/TRAINING PROGRAM

## 2021-04-29 PROCEDURE — 99214 OFFICE O/P EST MOD 30 MIN: CPT | Mod: 95 | Performed by: DERMATOLOGY

## 2021-04-29 PROCEDURE — 86235 NUCLEAR ANTIGEN ANTIBODY: CPT | Mod: 59 | Performed by: STUDENT IN AN ORGANIZED HEALTH CARE EDUCATION/TRAINING PROGRAM

## 2021-04-29 PROCEDURE — 84156 ASSAY OF PROTEIN URINE: CPT | Performed by: STUDENT IN AN ORGANIZED HEALTH CARE EDUCATION/TRAINING PROGRAM

## 2021-04-29 PROCEDURE — 73130 X-RAY EXAM OF HAND: CPT | Mod: GC | Performed by: RADIOLOGY

## 2021-04-29 PROCEDURE — 86431 RHEUMATOID FACTOR QUANT: CPT | Performed by: STUDENT IN AN ORGANIZED HEALTH CARE EDUCATION/TRAINING PROGRAM

## 2021-04-29 PROCEDURE — 86225 DNA ANTIBODY NATIVE: CPT | Performed by: STUDENT IN AN ORGANIZED HEALTH CARE EDUCATION/TRAINING PROGRAM

## 2021-04-29 PROCEDURE — 85025 COMPLETE CBC W/AUTO DIFF WBC: CPT | Performed by: STUDENT IN AN ORGANIZED HEALTH CARE EDUCATION/TRAINING PROGRAM

## 2021-04-29 PROCEDURE — 86235 NUCLEAR ANTIGEN ANTIBODY: CPT | Performed by: STUDENT IN AN ORGANIZED HEALTH CARE EDUCATION/TRAINING PROGRAM

## 2021-04-29 PROCEDURE — 86803 HEPATITIS C AB TEST: CPT | Performed by: STUDENT IN AN ORGANIZED HEALTH CARE EDUCATION/TRAINING PROGRAM

## 2021-04-29 PROCEDURE — 86160 COMPLEMENT ANTIGEN: CPT | Mod: 59 | Performed by: STUDENT IN AN ORGANIZED HEALTH CARE EDUCATION/TRAINING PROGRAM

## 2021-04-29 PROCEDURE — 86200 CCP ANTIBODY: CPT | Performed by: STUDENT IN AN ORGANIZED HEALTH CARE EDUCATION/TRAINING PROGRAM

## 2021-04-29 PROCEDURE — 81001 URINALYSIS AUTO W/SCOPE: CPT | Performed by: STUDENT IN AN ORGANIZED HEALTH CARE EDUCATION/TRAINING PROGRAM

## 2021-04-29 PROCEDURE — 80053 COMPREHEN METABOLIC PANEL: CPT | Performed by: STUDENT IN AN ORGANIZED HEALTH CARE EDUCATION/TRAINING PROGRAM

## 2021-04-29 PROCEDURE — 86160 COMPLEMENT ANTIGEN: CPT | Performed by: STUDENT IN AN ORGANIZED HEALTH CARE EDUCATION/TRAINING PROGRAM

## 2021-04-29 NOTE — PATIENT INSTRUCTIONS
[facility] Dermatology Visit    Thank you for allowing us to participate in your care. Your findings, instructions and follow-up plan are as follows:         When should I call my doctor?    If you are worsening or not improving, please, contact us or seek urgent care as noted below.     Who should I call with questions (adults)?    [facility] (adult and pediatric): [phone]     [facility] (adult): [phone]    For urgent needs outside of business hours call the [facility] at [phone] and ask for the dermatology resident on call    If this is a medical emergency and you are unable to reach an ER, Call 911      Who should I call with questions (pediatric)?  [facility]- Pediatric Dermatology  Dr. Denisa Garcia, Dr. Debby Wiley, Dr. Shantal Heaton, Elda Bella, [place]  Dr. Laly Norton, Dr. Rosie Manzo & Dr. Porter Díaz  Non Urgent  Nurse Triage Line; [phone]- Rebecca and Jayshree RN Care Coordinators   [place] (/Complex ) [phone]    If you need a prescription refill, please contact your pharmacy. Refills are approved or denied by our Physicians during normal business hours, Monday through Fridays  Per office policy, refills will not be granted if you have not been seen within the past year (or sooner depending on your child's condition)    Scheduling Information:  Pediatric Appointment Scheduling and Call Center [phone]  Radiology Scheduling- [phone]  Sedation Unit Scheduling- [phone]  [facility] Scheduling- General [phone]; Pediatric Dermatology [phone]  Main  Services: [phone]  Tajik: [phone]  Croatian: [phone]  Hmong/Ukrainian/Lao: [phone]  Preadmission Nursing Department Fax Number: [phone] (Fax all pre-operative paperwork to this number)    For urgent matters arising during evenings,  weekends, or holidays that cannot wait for normal business hours please call [phone] and ask for the Dermatology Resident On-Call to be paged.

## 2021-04-29 NOTE — Clinical Note
NEeds excision, atypical mole within 4 weeks.   Compound nevus with junctional atypia, extends to margin biopsy 4/9/2021, recommended excision, right mid back

## 2021-04-29 NOTE — LETTER
4/29/2021         RE: Deacon Torres  [address]  [place] [address]        Dear Colleague,    Thank you for referring your patient, Deacon Torres, to the [facility]. Please see a copy of my visit note below.    Teledermatology Nurse Call Patients:     Are you  in the [facility] at the time of the encounter? yes    Today's visit will be billed to you and your insurance.    A teledermatology visit is not as thorough as an in-person visit and the quality of the photograph sent may not be of the same quality as that taken by the dermatology clinic.         Patient following up for rash, was prescribed hydrocortisone 2.5% cream to use for 2 weeks. Patient states worked well, rash currently gone                                                         Niru Lawton LPN    [facility] Dermatology Note  Encounter Date: Apr 29, 2021  Store-and-Forward and Telephone ([phone]  ). Location of teledermatologist: [facility].  Start time: 3:56pm. End time: 4pm.    Dermatology Problem List:  1.Atypical and dysplastic nevi  -Compound nevus with junctional atypia, extends to margin biopsy 4/9/2021, recommended excision, right mid back  - Compound nevus with moderate dysplasia, right lateral ankle  -s/p biopsy 7/18/2016  2. Actinic keratosis  -Efudex initiated 4/9/21  -s/p cryotherapy  3. Erythematous scaly patch on the bilateral cheeks  -KATHRINE ordered  -hydrocortisone 2.5% cream BID x 2 weeks       ____________________________________________     Assessment & Plan:     # History of DN: last skin exam 4/9/201     # Actinic keratosis - left malar cheek started efudex, last application was tuesay (Seperated from lupus rash)     # Erythematous scaly patch on the bilateral cheeks. Does involve the nasolabial folds as well (could be seb derm)BUT Patient reports possible history of lupus. His KATHRINE was subsequently positive. He just  completed his lab work up and results are pending from rheumatlogy. He is better with topical steroid  -KATHRINE ordered today  -Hydrocrotisone 2.5% cream BID x 1 week on and 1 week off, okay to repeat if the rash returns  -use sunscreen, reviewed photosensitivity     # Compound nevus with junctional atypia, extends to margin biopsy 4/9/2021, recommended excision, right mid back       Procedures Performed:    None    Follow-up: 4-8(s) in-person, or earlier for new or changing lesions    Staff:     Barbara Vogt MD    Department of Dermatology  [facility]: Phone: [phone], Fax:[phone]  [facility]: Phone: [phone], Fax: [phone]      ____________________________________________    CC: Derm Problem (Follow up for rash)    HPI:  Mr. Deacon Torres is a(n) 60 year old male who presents today as a return patient for rash on the face. Reports improved with cream and rash is now gone.     He did treat a spot on the left cheek and he did get a rash and that resolved    Patient is otherwise feeling well, without additional skin concerns.    Labs Reviewed:  FINAL DIAGNOSIS:   Skin, right mid back, shave:   - Compound nevus with junctional atypia - (see comment)     COMMENT:   PRAME is diffusely negative, arguing against melanoma in situ arising in a    nevus. However, given that the   lesion extends to the margin, re-excision is recommended to assure   complete removal.     Physical Exam:  Vitals: There were no vitals taken for this visit.  SKIN: Teledermatology photos were reviewed; image quality and interpretability: acceptable. Image date: today  - erythematous patch and brown path on the left cheek adjacent to one another  - No other lesions of concern on areas examined.     Medications:  Current Outpatient Medications   Medication     acetaminophen (TYLENOL) 325 MG tablet      clonazePAM (KLONOPIN) 0.5 MG tablet     fluorouracil (EFUDEX) 5 % external cream     hydrocortisone 2.5 % cream     ibuprofen (ADVIL/MOTRIN) 600 MG tablet     mirtazapine (REMERON) 15 MG tablet     sertraline (ZOLOFT) 50 MG tablet     No current facility-administered medications for this visit.       Past Medical/Surgical History:   Patient Active Problem List   Diagnosis     Otalgia, right     Past Medical History:   Diagnosis Date     Actinic keratosis      Carpal tunnel syndrome      Depression      Depressive disorder      Gastroesophageal reflux disease      Migraines      Tinnitus        CC No referring provider defined for this encounter. on close of this encounter.                                                                                                                                                                Again, thank you for allowing me to participate in the care of your patient.        Sincerely,        Barbara Vogt MD

## 2021-04-29 NOTE — PROGRESS NOTES
Teledermatology Nurse Call Patients:     Are you  in the [facility] at the time of the encounter? yes    Today's visit will be billed to you and your insurance.    A teledermatology visit is not as thorough as an in-person visit and the quality of the photograph sent may not be of the same quality as that taken by the dermatology clinic.         Patient following up for rash, was prescribed hydrocortisone 2.5% cream to use for 2 weeks. Patient states worked well, rash currently gone                                                         Niru Lawton LPN    [facility] Dermatology Note  Encounter Date: Apr 29, 2021  Store-and-Forward and Telephone ([phone]  ). Location of teledermatologist: [facility].  Start time: 3:56pm. End time: 4pm.    Dermatology Problem List:  1.Atypical and dysplastic nevi  -Compound nevus with junctional atypia, extends to margin biopsy 4/9/2021, recommended excision, right mid back  - Compound nevus with moderate dysplasia, right lateral ankle  -s/p biopsy 7/18/2016  2. Actinic keratosis  -Efudex initiated 4/9/21  -s/p cryotherapy  3. Erythematous scaly patch on the bilateral cheeks  -KATHRINE ordered  -hydrocortisone 2.5% cream BID x 2 weeks       ____________________________________________     Assessment & Plan:     # History of DN: last skin exam 4/9/201     # Actinic keratosis - left malar cheek started efudex, last application was tuesay (Seperated from lupus rash)     # Erythematous scaly patch on the bilateral cheeks. Does involve the nasolabial folds as well (could be seb derm)BUT Patient reports possible history of lupus. His KATHRINE was subsequently positive. He just completed his lab work up and results are pending from rheumatlogy. He is better with topical steroid  -KATHRINE ordered today  -Hydrocrotisone 2.5% cream BID x 1 week on and 1 week off, okay to repeat if the rash returns  -use sunscreen, reviewed  photosensitivity     # Compound nevus with junctional atypia, extends to margin biopsy 4/9/2021, recommended excision, right mid back       Procedures Performed:    None    Follow-up: 4-8(s) in-person, or earlier for new or changing lesions    Staff:     Barbara Vogt MD    Department of Dermatology  [facility]: Phone: [phone], Fax:[phone]  [facility]: Phone: [phone], Fax: [phone]      ____________________________________________    CC: Derm Problem (Follow up for rash)    HPI:  Mr. Deacon Torres is a(n) 60 year old male who presents today as a return patient for rash on the face. Reports improved with cream and rash is now gone.     He did treat a spot on the left cheek and he did get a rash and that resolved    Patient is otherwise feeling well, without additional skin concerns.    Labs Reviewed:  FINAL DIAGNOSIS:   Skin, right mid back, shave:   - Compound nevus with junctional atypia - (see comment)     COMMENT:   PRAME is diffusely negative, arguing against melanoma in situ arising in a    nevus. However, given that the   lesion extends to the margin, re-excision is recommended to assure   complete removal.     Physical Exam:  Vitals: There were no vitals taken for this visit.  SKIN: Teledermatology photos were reviewed; image quality and interpretability: acceptable. Image date: today  - erythematous patch and brown path on the left cheek adjacent to one another  - No other lesions of concern on areas examined.     Medications:  Current Outpatient Medications   Medication     acetaminophen (TYLENOL) 325 MG tablet     clonazePAM (KLONOPIN) 0.5 MG tablet     fluorouracil (EFUDEX) 5 % external cream     hydrocortisone 2.5 % cream     ibuprofen (ADVIL/MOTRIN) 600 MG tablet     mirtazapine (REMERON) 15 MG tablet     sertraline (ZOLOFT) 50 MG tablet     No current  facility-administered medications for this visit.       Past Medical/Surgical History:   Patient Active Problem List   Diagnosis     Otalgia, right     Past Medical History:   Diagnosis Date     Actinic keratosis      Carpal tunnel syndrome      Depression      Depressive disorder      Gastroesophageal reflux disease      Migraines      Tinnitus        CC No referring provider defined for this encounter. on close of this encounter.

## 2021-04-30 ENCOUNTER — TELEPHONE (OUTPATIENT)
Dept: DERMATOLOGY | Facility: CLINIC | Age: 61
End: 2021-04-30

## 2021-04-30 LAB
C3 SERPL-MCNC: 114 MG/DL (ref 81–157)
C4 SERPL-MCNC: 28 MG/DL (ref 13–39)
CCP AB SER IA-ACNC: 2 U/ML
DSDNA AB SER-ACNC: 1 IU/ML
RHEUMATOID FACT SER NEPH-ACNC: <7 IU/ML (ref 0–20)

## 2021-04-30 NOTE — RESULT ENCOUNTER NOTE
Dear Radames,     Your labs are negative. Blood tests for lupus are negative. Antibodies for rheumatoid arthritis are negative. Urine test is negative for abnormal protein or blood. Hand x-rays show degenerative changes consistent with osteoarthritis.     Please let us know if you have any questions or concerns.    Regards,  Monik Fairchild MD

## 2021-04-30 NOTE — TELEPHONE ENCOUNTER
Excision previsit information                                                    Deacon Torres is a 60 year old male     Patient is being referred to Dr. Presley   Referring Provider:    For treatment of: Compound nevus with junctional atypia, extends to margin biopsy 4/9/2021, recommended excision, right mid back   Site(s): right mid back    -if site is the groin or below knee, send to RN for initiation of antibiotic prophylaxis protocol.  Previous Records received:  NO    Medication & Allergy Information                                                    CURRENT MEDICATIONS:   Current Outpatient Medications   Medication Sig Dispense Refill     acetaminophen (TYLENOL) 325 MG tablet Take 650 mg by mouth as needed        clonazePAM (KLONOPIN) 0.5 MG tablet Take 0.5 mg by mouth nightly as needed for anxiety       fluorouracil (EFUDEX) 5 % external cream Apply to lesion on left cheek twice daily for 3 weeks.  Wash hands after applying. (Patient not taking: Reported on 4/29/2021) 40 g 0     hydrocortisone 2.5 % cream Apply twice daily for 2 weeks to mid cheeks 30 g 0     ibuprofen (ADVIL/MOTRIN) 600 MG tablet Take 600 mg by mouth as needed        mirtazapine (REMERON) 15 MG tablet Take 15 mg by mouth At Bedtime       sertraline (ZOLOFT) 50 MG tablet Take 150 mg by mouth         Do you take the following medications:  Aspirin:  NO  Ibuprofen/Advil/Motrin, Aleve/Naproxen:   YES  Coumadin, Eliquis, Pradaxa, Xarelto:   NO   -If on Coumadin, INR should be checked within 7 days of surgery.  Range should be 3.5 or less or within therapeutic range.  Vitamin E:   NO  Plavix:   NO    Gloria's wart: NO   Garlic supplement:  NO   Fish Oil: NO  Antibiotic Prophylaxis:  NO    Do you have an ALLERGIC REACTION to any medications:  YES   Codeine    Past Medical History                                                    Do you currently or have you previously had any of the following conditions:       HIV/AIDS:   NO    Hepatitis:  NO    Suppressed Immune System:  NO    Autoimmune disorder (eg RA, Lupus):  NO    Prolonged bleeding or bleeding disorder:  NO    Fever blisters/herpes, cold sores, shingles:  NO    Kidney disease:  NO  Creatinine   Date Value Ref Range Status   04/29/2021 1.04 0.66 - 1.25 mg/dL Final   ]    Pacemaker or Defibrillator:  NO.      History of artificial or heart valve replacement:  NO.      Endocarditis/Rheumatic Fever: NO    Organ transplant: NO.      Joint replacement within past 2 years: NO    Previous prosthetic joint infection: NO    Diabetes: NO    Pregnant:: N/A    Keloids or Abnormal scars: NO    Mobility device (wheelchair, transfer difficulty): NO    Tobacco use:  NO.    History   Smoking Status     Never Smoker   Smokeless Tobacco     Never Used       If any positives, send to RN to initiate antibiotic prophylaxis protocol and/or anticoagulation management protocol    Reviewed by:  KELI Dominguez Ronda, MD P [facility] [place]             NEeds excision, atypical mole within 4 weeks.   Compound nevus with junctional atypia, extends to margin biopsy 4/9/2021, recommended excision, right mid back

## 2021-04-30 NOTE — RESULT ENCOUNTER NOTE
Dear Radames,     Here are your hand x-ray results, discussed in my previous message.     Please let us know if you have any questions or concerns.    Regards,  Monik Fairchild MD

## 2021-06-03 ENCOUNTER — OFFICE VISIT (OUTPATIENT)
Dept: DERMATOLOGY | Facility: CLINIC | Age: 61
End: 2021-06-03
Payer: COMMERCIAL

## 2021-06-03 DIAGNOSIS — D22.9 ATYPICAL NEVUS: Primary | ICD-10-CM

## 2021-06-03 PROCEDURE — 11402 EXC TR-EXT B9+MARG 1.1-2 CM: CPT | Mod: GC | Performed by: DERMATOLOGY

## 2021-06-03 PROCEDURE — 88305 TISSUE EXAM BY PATHOLOGIST: CPT | Performed by: DERMATOLOGY

## 2021-06-03 PROCEDURE — 12032 INTMD RPR S/A/T/EXT 2.6-7.5: CPT | Mod: GC | Performed by: DERMATOLOGY

## 2021-06-03 ASSESSMENT — PAIN SCALES - GENERAL: PAINLEVEL: MILD PAIN (2)

## 2021-06-03 NOTE — NURSING NOTE
Excision previsit information                                                    Deacon Torres is a 60 year old male      Patient is being referred to Dr. Presley   Referring Provider:    For treatment of: Compound nevus with junctional atypia, extends to margin biopsy 4/9/2021, recommended excision, right mid back   Site(s): right mid back               -if site is the groin or below knee, send to RN for initiation of antibiotic prophylaxis protocol.  Previous Records received:  NO     Medication & Allergy Information                                                    CURRENT MEDICATIONS:   Current Outpatient Prescriptions          Current Outpatient Medications   Medication Sig Dispense Refill     acetaminophen (TYLENOL) 325 MG tablet Take 650 mg by mouth as needed          clonazePAM (KLONOPIN) 0.5 MG tablet Take 0.5 mg by mouth nightly as needed for anxiety         fluorouracil (EFUDEX) 5 % external cream Apply to lesion on left cheek twice daily for 3 weeks.  Wash hands after applying. (Patient not taking: Reported on 4/29/2021) 40 g 0     hydrocortisone 2.5 % cream Apply twice daily for 2 weeks to mid cheeks 30 g 0     ibuprofen (ADVIL/MOTRIN) 600 MG tablet Take 600 mg by mouth as needed          mirtazapine (REMERON) 15 MG tablet Take 15 mg by mouth At Bedtime         sertraline (ZOLOFT) 50 MG tablet Take 150 mg by mouth                Do you take the following medications:  Aspirin:  NO  Ibuprofen/Advil/Motrin, Aleve/Naproxen:   YES  Coumadin, Eliquis, Pradaxa, Xarelto:   NO              -If on Coumadin, INR should be checked within 7 days of surgery.  Range should be 3.5 or less or within therapeutic range.  Vitamin E:   NO  Plavix:   NO       Gloria's wart: NO     Garlic supplement:  NO          Fish Oil: NO  Antibiotic Prophylaxis:  NO     Do you have an ALLERGIC REACTION to any medications:  YES   Codeine     Past Medical History                                                    Do you  currently or have you previously had any of the following conditions:        HIV/AIDS:  NO    Hepatitis:  NO    Suppressed Immune System:  NO    Autoimmune disorder (eg RA, Lupus):  NO    Prolonged bleeding or bleeding disorder:  NO    Fever blisters/herpes, cold sores, shingles:  NO    Kidney disease:  NO        Creatinine   Date Value Ref Range Status   04/29/2021 1.04 0.66 - 1.25 mg/dL Final   ]    Pacemaker or Defibrillator:  NO.      History of artificial or heart valve replacement:  NO.      Endocarditis/Rheumatic Fever: NO    Organ transplant: NO.      Joint replacement within past 2 years: NO    Previous prosthetic joint infection: NO    Diabetes: NO    Pregnant:: N/A    Keloids or Abnormal scars: NO    Mobility device (wheelchair, transfer difficulty): NO    Tobacco use:  NO.        History   Smoking Status     Never Smoker   Smokeless Tobacco     Never Used         If any positives, send to RN to initiate antibiotic prophylaxis protocol and/or anticoagulation management protocol     Reviewed by:  Meagan Ashby LPN

## 2021-06-03 NOTE — NURSING NOTE
The following medication was given:     MEDICATION:  Lidocaine with epinephrine 1% 1:[id number]  ROUTE: SQ  SITE: see procedure note  DOSE: 6cc  LOT #: [id number]  : [employer]  EXPIRATION DATE: 2/2022  NDC#: 3209-0343-98  Was there drug waste? 0cc  Multi-dose vial: Yes    Meagan Ashby LPN  Parisa 3, 2021    Steri strips and pressure dressing applied to excision site on right mid back.  Wound care instructions reviewed with patient and AVS provided.  Patient verbalized understanding.  No further questions or concerns at this time.      Meagan Ashby LPN

## 2021-06-03 NOTE — LETTER
6/3/2021         RE: Deacon Torres  [address] [place] [address]  [place] [address]        Dear Colleague,    Thank you for referring your patient, Deacon Torres, to the [facility]. Please see a copy of my visit note below.    DERMATOLOGY EXCISION PROCEDURE NOTE    Dermatology Problem List:  1.Atypical and dysplastic nevi  -Compound nevus with junctional atypia, extends to margin, right mid back s/p excision 6/3/21  - Compound nevus with moderate dysplasia, right lateral ankle  -s/p biopsy 7/18/2016  2. Actinic keratosis  -Efudex initiated 4/9/21  -s/p cryotherapy  3. Erythematous scaly patch on the bilateral cheeks  -KATHRINE ordered  -hydrocortisone 2.5% cream BID x 2 weeks    NAME OF PROCEDURE: Excision intermediate layered linear closure  Staff surgeon: Rudolph Presley DO  Fellow: Lamont Mejia MD  Resident: Keren Bradley MD  Scrub Nurse: Meagan Ashby LPN    PRE-OPERATIVE DIAGNOSIS:  Compound nevus with junctional atypia  POST-OPERATIVE DIAGNOSIS: same   FINAL EXCISION SIZE(DEFECT SIZE): 1.6X1.6 cm, with 5 mm margin   FINAL REPAIR LENGTH: 3.9 cm     INDICATIONS: This patient presented with a 0.6cm compound nevus with junctional atypia of the right mid back. Excision was indicated. We discussed the principles of treatment and most likely complications including scarring, bleeding, infection, incomplete excision, wound dehiscence, pain, nerve damage, and recurrence. Informed consent was obtained and the patient underwent the procedure as follows:    PROCEDURE: The patient was taken to the operative suite. Time-out was performed.  The treatment area was anesthetized with 1% lidocaine and epinephrine (1:100,000). The area was prepped with Chlorhexidine and rinsed with sterile saline and draped with sterile towels. The lesion was delineated and excised down to subcutaneous fat in a elliptical manner. Hemostasis was obtained by electrocoagulation.     REPAIR: An intermediate layered linear  closure was selected as the procedure which would maximally preserve both function and cosmesis.    After the excision of the tumor, the area was carefully undermined. Hemostasis was obtained with electrocoagulation.  Closure was oriented so that the wound was in the patient's natural skin tension lines. The subcutaneous and dermal layers were then closed with 4-0 Monocryl sutures. The epidermis was then carefully approximated along the length of the wound using 4-0 Monocryl running subcuticular sutures.     The final wound length was 3.9 cm. A total of 6 ml of anesthesia was administered for all surgical sites. Estimated blood loss was less than 10 ml for all surgical sites. A sterile pressure dressing was applied and wound care instructions, with a written handout, were given. The [facility] was [facility] alert and ambulatory.    Follow-up as needed for wound evaluation.       Anatomic Pathology Results: pending      Staff Involved:  Staff/residnet    Dr. Presley present for entire procedure    Keren Bradley PGY3  Dermatology Resident  pager      Staff Physician Comments:   I saw and evaluated the patient with the resident and I agree with the assessment and plan. I was present for the key portions of the above major procedure and examination.    Rudolph Presley DO    Department of Dermatology  [facility]: Phone: [phone], Fax:[phone]  [facility]: Phone: [phone], Fax: [phone]                    Again, thank you for allowing me to participate in the care of your patient.        Sincerely,        Rudolph Presley MD

## 2021-06-03 NOTE — PATIENT INSTRUCTIONS
Excision Wound Care Instructions with Steri strips      I will experience scar, altered skin color, bleeding, swelling, pain, crusting and redness. I may experience altered sensation. Risks are excessive bleeding, infection, muscle weakness, thick (hypertrophic or keloidal) scar, and recurrence. A second procedure may be recommended to obtain the best cosmetic or functional result.    Possible complications of any surgical procedure are bleeding, infection, scarring, alteration in skin color and sensation, muscle weakness in the area, wound dehiscence or seperation, or recurrence of the lesion or disease. On occasion, after healing, a secondary procedure or revision may be recommended in order to obtain the best cosmetic or functional result.       After your surgery, a pressure bandage will be placed over the area that has sutures. This will help prevent bleeding. Please, follow these instructions as they will help you to prevent complications as your wound heals.        For the First 48 hours After Surgery:    1. Leave the pressure bandage on and keep it dry. If it should come loose, you may retape it, but do not take it off.    2. Relax and take it easy. Do not do any vigorous exercise, heavy lifting, or bending forward. This could cause the wound to bleed.    3. Post-operative pain is usually mild. You may take plain or extra strength Tylenol every 4 hours as needed (do not take more than 4,000mg in one day) if you have no liver problems and your doctor says it is okay. Do not take any medicine that contains aspirin, ibuprofen or motrin unless you have been recommended these by a doctor.  Avoid alcohol and vitamin E as these may increase your tendency to bleed.    4. You may put an ice pack around the bandaged area for 20 minutes every 2-3 hours. This may help reduce swelling, bruising, and pain. Make sure the ice pack is waterproof so that the pressure bandage does not get wet.     5. You may see a small amount  of drainage or blood on your pressure bandage. This is normal. However, if drainage or bleeding continues or saturates the bandage, you will need to apply firm pressure over the bandage with a washcloth for 15 minutes. If bleeding continues after applying pressure for 15 minutes then go to the nearest emergency room.        48 Hours After Surgery remove outer white bandage down to clear plastic film. Leave this bandage on as long as it is intact up to two weeks. If it falls off prior to two weeks follow daily wound care below to keep covered for two weeks post surgery.    Daily Wound Care:    1. Once the steri strips fall off wash wound with a mild soap and water.  Use caution when washing the wound, be gentle and do not let the forceful shower stream hit the wound directly. DO NOT WASH WITH HYDROGEN PEROXIDE FOR THIS MIGHT CAUSE THE SUTURES TO DISSOLVE FASTER THAN WHAT WE WANT.     2. PAT DRY.    3. Once steri strips fall off apply Vaseline (from a new container or tube) over the suture line with a Q-tip until it is completely healed. It is very important to keep the wound continuously moist, as wounds heal best in a moist environment.    4. Keep the site covered until site is healed, you can cover it with a Telfa (non-stick) dressing and tape or a band-aid. While your steri strips are on you can use them as your bandage.    5. Once steri strips fall off if you are unable to keep wound covered, you must apply Vaseline every 2 - 3 hours (while awake) to ensure it is being kept moist for optimal healing until completely healed. A dressing overnight is recommended to keep the area moist.        Call Us If:    1. You have pain that is not controlled with Tylenol.    2. You have signs or symptoms of an infection, such as: fever over 100 degrees F, redness, warmth, or foul-smelling or yellow/creamy drainage from the wound.        Who should I call with questions?  [facility]: [phone]    [facility]: [phone]  For urgent needs outside of business hours call the [facility] at [phone] and ask for the dermatology resident on call

## 2021-06-06 ENCOUNTER — HEALTH MAINTENANCE LETTER (OUTPATIENT)
Age: 61
End: 2021-06-06

## 2021-06-07 LAB — COPATH REPORT: NORMAL

## 2021-06-08 ENCOUNTER — TELEPHONE (OUTPATIENT)
Dept: DERMATOLOGY | Facility: CLINIC | Age: 61
End: 2021-06-08

## 2021-06-08 NOTE — TELEPHONE ENCOUNTER
----- Message from Rudolph Presley MD sent at 6/7/2021  5:54 PM CDT -----  Please call the patient with pathology results.  The pathologist felt we were successful removing the residual atypical mole.   As long as the wound is healing well, no additional surgery is necessary.  Thank you.

## 2021-06-08 NOTE — TELEPHONE ENCOUNTER
Rudolph Moser MD P [facility] Dermatology Adult [place]             Please call the patient with pathology results.   The pathologist felt we were successful removing the residual atypical mole.   As long as the wound is healing well, no additional surgery is necessary.   Thank you.    Associated Results    Dermatological path order and indications  Order: [id number]  Status:  Final result   Visible to patient:  Yes (MyChart) Dx:  Atypical nevus  Component 5d ago   Copath Report Patient Name: ALEJANDRO LAWSON   MR#: [id number]   Specimen #: [id number]   Collected: 6/3/2021   Received: 6/4/2021   Reported: 6/7/2021 15:53   Ordering Phy(s): RUDOLPH MOSER     For improved result formatting, select 'View Enhanced Report Format' under    Linked Documents section.     SPECIMEN(S):   Skin, right mid back, excision     FINAL DIAGNOSIS:   Skin, right mid back, excision:   - Residual atypical nevus adjacent to scar from the prior procedure,   completely excised - (see description           Meagan Ashby LPN   6/8/2021  8:14 AM CDT      Called and informed patient of results. Patient had no further questions or concerns at this time and will follow up as scheduled in December.      Meagan Ashby LPN

## 2021-09-26 ENCOUNTER — HEALTH MAINTENANCE LETTER (OUTPATIENT)
Age: 61
End: 2021-09-26

## 2021-12-07 ENCOUNTER — OFFICE VISIT (OUTPATIENT)
Dept: DERMATOLOGY | Facility: CLINIC | Age: 61
End: 2021-12-07
Payer: COMMERCIAL

## 2021-12-07 DIAGNOSIS — L21.9 DERMATITIS, SEBORRHEIC: ICD-10-CM

## 2021-12-07 DIAGNOSIS — L57.0 ACTINIC KERATOSIS: ICD-10-CM

## 2021-12-07 DIAGNOSIS — Z86.018 HISTORY OF DYSPLASTIC NEVUS: Primary | ICD-10-CM

## 2021-12-07 PROCEDURE — 17000 DESTRUCT PREMALG LESION: CPT | Performed by: DERMATOLOGY

## 2021-12-07 PROCEDURE — 99214 OFFICE O/P EST MOD 30 MIN: CPT | Mod: 25 | Performed by: DERMATOLOGY

## 2021-12-07 NOTE — PROGRESS NOTES
[facility] Dermatology Note  Encounter Date: Dec 7, 2021  Office Visit     Dermatology Problem List:  1.Atypical and dysplastic nevi  -Compound nevus with junctional atypia, extends to margin, right mid back, s/p excision 6/3/21  -Compound nevus with moderate dysplasia, right lateral ankle, s/p biopsy 7/18/2016  2. Actinic keratosis left cheek and now clear  -Efudex initiated 4/9/21  -s/p cryotherapy  3. Facial rash, c/w seborrheic dermatitis   -KATHRINE ordered by derm but not obtained, seen by rheumatology and obtained and 1:40-hydrocortisone 2.5% cream BID x 2 weeks  -Declines ketoconazole on 12/7/21  ____________________________________________    Assessment & Plan:    # History of dysplastic and atypical nevi: No evidence of recurrence. While these are benign, they are a marker for increased melanoma risk.  - Recommended yearly skin checks.    # Rash, face, most likely seborrheic dermatiits clinically. Has positive KATHRINE 1:40, saw rheum, complement normal, anti DNA normal, cbc normal, cmp normal. I do not see any evidence of lupus on the skin.    - Declines ketoconazole. Has hydrocortisone    # Actinic keratosis - right cheek   - See procedure note.        Procedures Performed:   - Cryotherapy procedure note. After verbal consent and discussion of risks and benefits including, but not limited to, dyspigmentation/scar, blister, and pain, 1 AK was(were) treated with 1-2 mm freeze border for 1-2 cycles with liquid nitrogen. Post cryotherapy instructions were provided.    Follow-up: 1 year(s) in-person, or earlier for new or changing lesions    Staff and Scribe:     Scribe Disclosure:   I, Reji Garcia, am serving as a scribe to document services personally performed by this physician, Dr. Barbara Vogt, based on data collection and the provider's statements to me.     Provider Disclosure:   The documentation recorded by the scribe accurately reflects the services I personally performed and the  "decisions made by me.    Barbara Vogt MD    Department of Dermatology  [facility]: Phone: [phone], Fax:[phone]  [facility]: Phone: [phone], Fax: [phone]      ____________________________________________    CC: Skin Check (no concerns/ hx of ak's and dn's)    HPI:  Mr. Deacon Torres is a(n) 61 year old male who presents today as a return patient for a skin check.    Last seen by Dr. Presley on 6/3/21 for the excision of a compound nevus with junctional atypia on the right mid back    Today, he has no areas of concern. He does have a lesion on his scalp that he notes he has \"forever\".    Patient is otherwise feeling well, without additional skin concerns.    Labs Reviewed:  N/A    Physical Exam:  Vitals: There were no vitals taken for this visit.  SKIN: Total skin excluding the undergarment areas was performed. The exam included the head/face, neck, both arms, chest, back, abdomen, both legs, digits and/or nails.   - Macular erythema of the bilateral cheeks involves medial nasolabial fold. Scale along the nasolabial fold and brows most c/w seborrheic dermatitis.  - Well healed scars in sites of prior atypical nevi.  - There is an erythematous macule with overyling adherent scale on the right cheek.  - No other lesions of concern on areas examined.     Medications:  Current Outpatient Medications   Medication     acetaminophen (TYLENOL) 325 MG tablet     clonazePAM (KLONOPIN) 0.5 MG tablet     fluorouracil (EFUDEX) 5 % external cream     hydrocortisone 2.5 % cream     ibuprofen (ADVIL/MOTRIN) 600 MG tablet     mirtazapine (REMERON) 15 MG tablet     sertraline (ZOLOFT) 50 MG tablet     No current facility-administered medications for this visit.      Past Medical History:   Patient Active Problem List   Diagnosis     Otalgia, right     Past Medical History:   Diagnosis Date "     Actinic keratosis      Carpal tunnel syndrome      Depression      Depressive disorder      Gastroesophageal reflux disease      Migraines      Tinnitus         CC No referring provider defined for this encounter. on close of this encounter.

## 2021-12-07 NOTE — NURSING NOTE
Deacon Torres's goals for this visit include:   Chief Complaint   Patient presents with     Skin Check     no concerns/ hx of ak's and dn's     He requests these members of his care team be copied on today's visit information:     PCP: Alexi Watts    Referring Provider:  No referring provider defined for this encounter.    There were no vitals taken for this visit.    Do you need any medication refills at today's visit? No    Lorelei Underwood, ARLETTE on 12/7/2021 at 10:33 AM

## 2021-12-07 NOTE — PATIENT INSTRUCTIONS
Cryotherapy    What is it?    Use of a very cold liquid, such as liquid nitrogen, to freeze and destroy abnormal skin cells that need to be removed    What should I expect?    Tenderness and redness    A small blister that might grow and fill with dark purple blood. There may be crusting.    More than one treatment may be needed if the lesions do not go away.    How do I care for the treated area?    Gently wash the area with your hands when bathing.    Use a thin layer of Vaseline to help with healing. You may use a Band-Aid.     The area should heal within 7-10 days and may leave behind a pink or lighter color.     Do not use an antibiotic or Neosporin ointment.     You may take acetaminophen (Tylenol) for pain.     Call your doctor if you have:    Severe pain    Signs of infection (warmth, redness, cloudy yellow drainage, and or a bad smell)    Questions or concerns    Who should I call with questions?       [facility]: [phone]       [facility]: [phone]       For urgent needs outside of business hours call the [facility] at [phone] and ask for the dermatology resident on call    Patient Education     Checking for Skin Cancer  You can find cancer early by checking your skin each month. There are 3 kinds of skin cancer. They are melanoma, basal cell carcinoma, and squamous cell carcinoma. Doing monthly skin checks is the best way to find new marks or skin changes. Follow the instructions below for checking your skin.   The ABCDEs of checking moles for melanoma   Check your moles or growths for signs of melanoma using ABCDE:     Asymmetry: the sides of the mole or growth don t match    Border: the edges are ragged, notched, or blurred    Color: the color within the mole or growth varies    Diameter: the mole or growth is larger than 6 mm (size of a pencil eraser)    Evolving: the size, shape, or color of the mole or growth is  changing (evolving is not shown in the images below)    Checking for other types of skin cancer  Basal cell carcinoma or squamous cell carcinoma have symptoms such as:       A spot or mole that looks different from all other marks on your skin    Changes in how an area feels, such as itching, tenderness, or pain    Changes in the skin's surface, such as oozing, bleeding, or scaliness    A sore that does not heal    New swelling or redness beyond the border of a mole    Who s at risk?  Anyone can get skin cancer. But you are at greater risk if you have:     Fair skin, light-colored hair, or light-colored eyes    Many moles or abnormal moles on your skin    A history of sunburns from sunlight or tanning beds    A family history of skin cancer    A history of exposure to radiation or chemicals    A weakened immune system  If you have had skin cancer in the past, you are at risk for recurring skin cancer.   How to check your skin  Do your monthly skin checkups in front of a full-length mirror. Check all parts of your body, including your:     Head (ears, face, neck, and scalp)    Torso (front, back, and sides)    Arms (tops, undersides, upper, and lower armpits)    Hands (palms, backs, and fingers, including under the nails)    Buttocks and genitals    Legs (front, back, and sides)    Feet (tops, soles, toes, including under the nails, and between toes)  If you have a lot of moles, take digital photos of them each month. Make sure to take photos both up close and from a distance. These can help you see if any moles change over time.   Most skin changes are not cancer. But if you see any changes in your skin, call your doctor right away. Only he or she can diagnose a problem. If you have skin cancer, seeing your doctor can be the first step toward getting the treatment that could save your life.   [employer] last reviewed this educational content on 4/1/2019 2000-2020 [employer]. [address] [facility],  [place] [address]. All rights reserved. This information is not intended as a substitute for professional medical care. Always follow your healthcare professional's instructions.       When should I call my doctor?    If you are worsening or not improving, please, contact us or seek urgent care as noted below.     Who should I call with questions (adults)?    [facility] (adult and pediatric): [phone]    [facility] (adult): [phone]    For urgent needs outside of business hours call the [facility] at [phone] and ask for the dermatology resident on call to be paged    If this is a medical emergency and you are unable to reach an ER, Call [phone]    Who should I call with questions (pediatric)?  [facility] Pediatric Dermatology  Dr. Denisa Garcia, Dr. Debby Wiley, Dr. Shantal Heaton, SANDY Long, Dr. Laly Norton, Dr. Rosie Manzo & Dr. Porter Díaz  Non-urgent nurse triage line; [phone]- Rebecca and Jayshree DICKEY Care Coordinators   [place] (/Complex ) [phone]    If you need a prescription refill, please contact your pharmacy. Refills are approved or denied by our Physicians during normal business hours, Monday through Fridays  Per office policy, refills will not be granted if you have not been seen within the past year (or sooner depending on your child's condition)    Scheduling Information:  Pediatric Appointment Scheduling and Call Center [phone]  Radiology Scheduling- [phone]  Sedation Unit Scheduling- [phone]  [place] Scheduling- General [phone]; Pediatric Dermatology [phone]  Main  Services: [phone]  Azeri: [phone]  Botswanan: [phone]  Hmong/Romanian/Citizen of [place]: [phone]  Preadmission Nursing Department Fax Number: [phone] (Fax all pre-operative paperwork to this number)    For urgent matters arising  during evenings, weekends, or holidays that cannot wait for normal business hours please call [phone] and ask for the dermatology resident on call to be paged.            [facility] Dermatology Visit    Thank you for allowing us to participate in your care. Your findings, instructions and follow-up plan are as follows:         When should I call my doctor?    If you are worsening or not improving, please, contact us or seek urgent care as noted below.     Who should I call with questions (adults)?    [facility] (adult and pediatric): [phone]    [facility] (adult): [phone]    For urgent needs outside of business hours call the [facility] at [phone] and ask for the dermatology resident on call    If this is a medical emergency and you are unable to reach an ER, Call [phone]    Who should I call with questions (pediatric)?  [facility]- Pediatric Dermatology  Dr. Denisa Garcia, Dr. Debby Wiley, Dr. Shantal Heaton, Elda Bella, SANDY Norton, Dr. Rosie Manzo & Dr. Porter Díaz  Non Urgent  Nurse Triage Line; [phone]- Rebecca and Jayshree RN Care Coordinators   [place] (/Complex ) [phone]    If you need a prescription refill, please contact your pharmacy. Refills are approved or denied by our physicians during normal business hours, Monday through Fridays  Per office policy, refills will not be granted if you have not been seen within the past year (or sooner depending on your child's condition).    Scheduling Information:  Pediatric Appointment Scheduling and Call Center [phone]  Radiology Scheduling- [phone]  Sedation Unit Scheduling- [phone]  [place] Scheduling- General [phone]; Pediatric Dermatology [phone]  Main  Services: [phone]  Honduran: [phone]  Shaila:  [phone]  Radha/Martiniquais/Iraqi: [phone]  Preadmission Nursing Department Fax Number: [phone] (fax all pre-operative paperwork to this number)    For urgent matters arising during evenings, weekends, or holidays that cannot wait for normal business hours please call [phone] and ask for the dermatology resident on call to be paged.

## 2021-12-07 NOTE — LETTER
12/7/2021         RE: Deacon Torres  [address]  [place] [address]        Dear Colleague,    Thank you for referring your patient, Deacon Torres, to the [facility]. Please see a copy of my visit note below.    [facility] Dermatology Note  Encounter Date: Dec 7, 2021  Office Visit     Dermatology Problem List:  1.Atypical and dysplastic nevi  -Compound nevus with junctional atypia, extends to margin, right mid back, s/p excision 6/3/21  -Compound nevus with moderate dysplasia, right lateral ankle, s/p biopsy 7/18/2016  2. Actinic keratosis left cheek and now clear  -Efudex initiated 4/9/21  -s/p cryotherapy  3. Facial rash, c/w seborrheic dermatitis   -KATHRINE ordered by derm but not obtained, seen by rheumatology and obtained and 1:40-hydrocortisone 2.5% cream BID x 2 weeks  -Declines ketoconazole on 12/7/21  ____________________________________________    Assessment & Plan:    # History of dysplastic and atypical nevi: No evidence of recurrence. While these are benign, they are a marker for increased melanoma risk.  - Recommended yearly skin checks.    # Rash, face, most likely seborrheic dermatiits clinically. Has positive KATHRINE 1:40, saw rheum, complement normal, anti DNA normal, cbc normal, cmp normal. I do not see any evidence of lupus on the skin.    - Declines ketoconazole. Has hydrocortisone    # Actinic keratosis - right cheek   - See procedure note.        Procedures Performed:   - Cryotherapy procedure note. After verbal consent and discussion of risks and benefits including, but not limited to, dyspigmentation/scar, blister, and pain, 1 AK was(were) treated with 1-2 mm freeze border for 1-2 cycles with liquid nitrogen. Post cryotherapy instructions were provided.    Follow-up: 1 year(s) in-person, or earlier for new or changing lesions    Staff and Scribe:     Scribe Disclosure:   I, Reji Garcia, am serving as a scribe to document services  "personally performed by this physician, Dr. Barbara Vogt, based on data collection and the provider's statements to me.     Provider Disclosure:   The documentation recorded by the scribe accurately reflects the services I personally performed and the decisions made by me.    Barbara Vogt MD    Department of Dermatology  [facility]: Phone: [phone], Fax:[phone]  [facility]: Phone: [phone], Fax: [phone]      ____________________________________________    CC: Skin Check (no concerns/ hx of ak's and dn's)    HPI:  Mr. Deacon Torres is a(n) 61 year old male who presents today as a return patient for a skin check.    Last seen by Dr. Presley on 6/3/21 for the excision of a compound nevus with junctional atypia on the right mid back    Today, he has no areas of concern. He does have a lesion on his scalp that he notes he has \"forever\".    Patient is otherwise feeling well, without additional skin concerns.    Labs Reviewed:  N/A    Physical Exam:  Vitals: There were no vitals taken for this visit.  SKIN: Total skin excluding the undergarment areas was performed. The exam included the head/face, neck, both arms, chest, back, abdomen, both legs, digits and/or nails.   - Macular erythema of the bilateral cheeks involves medial nasolabial fold. Scale along the nasolabial fold and brows most c/w seborrheic dermatitis.  - Well healed scars in sites of prior atypical nevi.  - There is an erythematous macule with overyling adherent scale on the right cheek.  - No other lesions of concern on areas examined.     Medications:  Current Outpatient Medications   Medication     acetaminophen (TYLENOL) 325 MG tablet     clonazePAM (KLONOPIN) 0.5 MG tablet     fluorouracil (EFUDEX) 5 % external cream     hydrocortisone 2.5 % cream     ibuprofen (ADVIL/MOTRIN) 600 MG tablet     mirtazapine " (REMERON) 15 MG tablet     sertraline (ZOLOFT) 50 MG tablet     No current facility-administered medications for this visit.      Past Medical History:   Patient Active Problem List   Diagnosis     Otalgia, right     Past Medical History:   Diagnosis Date     Actinic keratosis      Carpal tunnel syndrome      Depression      Depressive disorder      Gastroesophageal reflux disease      Migraines      Tinnitus         CC No referring provider defined for this encounter. on close of this encounter.      Again, thank you for allowing me to participate in the care of your patient.        Sincerely,        Barbara Vogt MD

## 2022-07-02 ENCOUNTER — HEALTH MAINTENANCE LETTER (OUTPATIENT)
Age: 62
End: 2022-07-02

## 2023-04-06 ENCOUNTER — OFFICE VISIT (OUTPATIENT)
Dept: DERMATOLOGY | Facility: CLINIC | Age: 63
End: 2023-04-06
Payer: COMMERCIAL

## 2023-04-06 DIAGNOSIS — L57.0 ACTINIC KERATOSES: ICD-10-CM

## 2023-04-06 DIAGNOSIS — D48.9 NEOPLASM OF UNCERTAIN BEHAVIOR: Primary | ICD-10-CM

## 2023-04-06 DIAGNOSIS — Z86.018 HISTORY OF DYSPLASTIC NEVUS: ICD-10-CM

## 2023-04-06 PROCEDURE — 99213 OFFICE O/P EST LOW 20 MIN: CPT | Mod: 25 | Performed by: DERMATOLOGY

## 2023-04-06 PROCEDURE — 11102 TANGNTL BX SKIN SINGLE LES: CPT | Performed by: DERMATOLOGY

## 2023-04-06 PROCEDURE — 17000 DESTRUCT PREMALG LESION: CPT | Mod: XS | Performed by: DERMATOLOGY

## 2023-04-06 PROCEDURE — 88305 TISSUE EXAM BY PATHOLOGIST: CPT | Performed by: DERMATOLOGY

## 2023-04-06 NOTE — NURSING NOTE
Deacon Torres's goals for this visit include:   Chief Complaint   Patient presents with     Skin Check     Patient in clinic for FBSE. No area of concern.         He requests these members of his care team be copied on today's visit information:     PCP: Alexi Watts    Referring Provider:  No referring provider defined for this encounter.    There were no vitals taken for this visit.    Do you need any medication refills at today's visit? No    Adriana Hendrix CMA on 4/6/2023 at 12:56 PM

## 2023-04-06 NOTE — PROGRESS NOTES
[facility] Dermatology Note  Encounter Date: Apr 6, 2023  Office Visit     Dermatology Problem List:  Last skin check 4/6/23, recommended yearly  0. NUB - right upper paraspinal back, s/p bx 4/6/23   1. Atypical and dysplastic nevi  - Compound nevus with junctional atypia, extends to margin - right mid back, s/p excision 6/3/21  - Compound nevus with moderate dysplasia - right lateral ankle, s/p biopsy 7/18/2016  2. Actinic keratosis left cheek and now clear  - Efudex initiated 4/9/21  - s/p cryotherapy  3. Facial rash c/w seborrheic dermatitis   - KATHRINE ordered by derm but not obtained, seen by rheumatology and obtained and 1:40-hydrocortisone 2.5% cream BID x 2 weeks  - Declines ketoconazole on 12/7/21  ____________________________________________     Assessment & Plan:     # History of dysplastic nevi, no clinical evidence of recurrence.  - ABCDEs: Counseled ABCDEs of melanoma: Asymmetry, Border (irregularity), Color (not uniform, changes in color), Diameter (greater than 6 mm which is about the size of a pencil eraser), and Evolving (any changes in preexisting moles).  - Sun protection: Counseled SPF30+ sunscreen, UPF clothing, sun avoidance, tanning bed avoidance.  - Recommended regular skin checks.     # Neoplasm of unspecified behavior of the skin (D49.2) on the right upper paraspinal back. The differential diagnosis includes rule out SCC.  - Photograph obtained.  - See procedure note.     # Actinic keratosis - right cheek x 1. Based on the location and chronic irritation to this lesion, treatment with cryotherapy is warranted.   - See cryo procedure note.    # Seborrheic keratosis, non irritated on the trunk and extremities. Explained to patient benign nature of lesion. No treatment is necessary at this time unless the lesion changes or becomes symptomatic.     - Monitor for changes.    # Solar lentigines on the sun exposed skin areas. Benign nature was discussed. No further intervention  required at this time.       Procedures Performed:   - Shave biopsy procedure note, location(s): right upper paraspinal back. After discussion of benefits and risks including but not limited to bleeding, infection, scar, incomplete removal, recurrence, and non-diagnostic biopsy, written consent and photographs were obtained. The area was cleaned with isopropyl alcohol. 0.5mL of 1% lidocaine with epinephrine was injected to obtain adequate anesthesia of lesion(s). Shave biopsy at site(s) performed. Hemostasis was achieved with aluminium chloride. Petrolatum ointment and a sterile dressing were applied. The patient tolerated the procedure and no complications were noted. The patient was provided with verbal and written post care instructions.      - Cryotherapy procedure note, location(s): right cheek. After verbal consent and discussion of risks and benefits including, but not limited to, dyspigmentation/scar, blister, and pain, 1 AK(s) was(were) treated with 1-2 mm freeze border for 1-2 cycles with liquid nitrogen. Post cryotherapy instructions were provided.      Follow-up: 1 year, earlier as needed    Staff and Scribe:     Scribe Disclosure:   I, Matthew Willis, am serving as a scribe to document services personally performed by this physician, Dr. Barbara Vogt, based on data collection and the provider's statements to me.       Provider Disclosure:   The documentation recorded by the scribe accurately reflects the services I personally performed and the decisions made by me.    Barbara Vogt MD    Department of Dermatology  [facility]: Phone: [phone], Fax:[phone]  [facility]: Phone: [phone], Fax: [phone]    ____________________________________________    CC: Skin Check (Patient in clinic for FBSE. No area of concern.  )    HPI:  Mr. Deacon Torres is a(n) 62 year old  male who presents today as a return patient for a skin check.     Last seen 12/7/21 for a skin check. At that time, 1 AK was treated with cryotherapy and patient declined ketoconazole for facial rash.     Today, he has no areas of concern.     Patient is otherwise feeling well, without additional skin concerns.    Labs Reviewed:  N/A    Physical Exam:  Vitals: There were no vitals taken for this visit.  SKIN: Total skin excluding the undergarment areas was performed. The exam included the head/face, neck, both arms, chest, back, abdomen, both legs, digits and/or nails.   - Well healed scars at sites of previous DN, no repigmentation or nodularity noted.   - There are waxy stuck on tan to brown papules on the trunk and extremities.    - Scattered brown macules on sun exposed areas.  - 5 mm red papule on the right upper paraspinal back.  - There are erythematous macules with overyling adherent scale on the right cheek x 1.      - No other lesions of concern on areas examined.     Medications:  Current Outpatient Medications   Medication     clonazePAM (KLONOPIN) 0.5 MG tablet     mirtazapine (REMERON) 15 MG tablet     sertraline (ZOLOFT) 50 MG tablet     No current facility-administered medications for this visit.      Past Medical History:   Patient Active Problem List   Diagnosis     Otalgia, right     History of dysplastic nevus     Dermatitis, seborrheic     Actinic keratosis     Past Medical History:   Diagnosis Date     Actinic keratosis      Carpal tunnel syndrome      Depression      Depressive disorder      Gastroesophageal reflux disease      Migraines      Tinnitus         CC No referring provider defined for this encounter. on close of this encounter.

## 2023-04-06 NOTE — LETTER
4/6/2023         RE: Deacon Torres  [address]  [place] [address]        Dear Colleague,    Thank you for referring your patient, Deacon Torres, to the [facility]. Please see a copy of my visit note below.    [facility] Dermatology Note  Encounter Date: Apr 6, 2023  Office Visit     Dermatology Problem List:  Last skin check 4/6/23, recommended yearly  0. NUB - right upper paraspinal back, s/p bx 4/6/23   1. Atypical and dysplastic nevi  - Compound nevus with junctional atypia, extends to margin - right mid back, s/p excision 6/3/21  - Compound nevus with moderate dysplasia - right lateral ankle, s/p biopsy 7/18/2016  2. Actinic keratosis left cheek and now clear  - Efudex initiated 4/9/21  - s/p cryotherapy  3. Facial rash c/w seborrheic dermatitis   - KATHRINE ordered by derm but not obtained, seen by rheumatology and obtained and 1:40-hydrocortisone 2.5% cream BID x 2 weeks  - Declines ketoconazole on 12/7/21  ____________________________________________     Assessment & Plan:     # History of dysplastic nevi, no clinical evidence of recurrence.  - ABCDEs: Counseled ABCDEs of melanoma: Asymmetry, Border (irregularity), Color (not uniform, changes in color), Diameter (greater than 6 mm which is about the size of a pencil eraser), and Evolving (any changes in preexisting moles).  - Sun protection: Counseled SPF30+ sunscreen, UPF clothing, sun avoidance, tanning bed avoidance.  - Recommended regular skin checks.     # Neoplasm of unspecified behavior of the skin (D49.2) on the right upper paraspinal back. The differential diagnosis includes rule out SCC.  - Photograph obtained.  - See procedure note.     # Actinic keratosis - right cheek x 1. Based on the location and chronic irritation to this lesion, treatment with cryotherapy is warranted.   - See cryo procedure note.    # Seborrheic keratosis, non irritated on the trunk and extremities. Explained to  patient benign nature of lesion. No treatment is necessary at this time unless the lesion changes or becomes symptomatic.     - Monitor for changes.    # Solar lentigines on the sun exposed skin areas. Benign nature was discussed. No further intervention required at this time.       Procedures Performed:   - Shave biopsy procedure note, location(s): right upper paraspinal back. After discussion of benefits and risks including but not limited to bleeding, infection, scar, incomplete removal, recurrence, and non-diagnostic biopsy, written consent and photographs were obtained. The area was cleaned with isopropyl alcohol. 0.5mL of 1% lidocaine with epinephrine was injected to obtain adequate anesthesia of lesion(s). Shave biopsy at site(s) performed. Hemostasis was achieved with aluminium chloride. Petrolatum ointment and a sterile dressing were applied. The patient tolerated the procedure and no complications were noted. The patient was provided with verbal and written post care instructions.      - Cryotherapy procedure note, location(s): right cheek. After verbal consent and discussion of risks and benefits including, but not limited to, dyspigmentation/scar, blister, and pain, 1 AK(s) was(were) treated with 1-2 mm freeze border for 1-2 cycles with liquid nitrogen. Post cryotherapy instructions were provided.      Follow-up: 1 year, earlier as needed    Staff and Scribe:     Scribe Disclosure:   I, Matthew Willis, am serving as a scribe to document services personally performed by this physician, Dr. Barbara Vogt, based on data collection and the provider's statements to me.       Provider Disclosure:   The documentation recorded by the scribe accurately reflects the services I personally performed and the decisions made by me.    Barbara Vogt MD    Department of Dermatology  [facility]: Phone: [phone], Fax:[phone]  [facility]  [facility]: Phone: [phone], Fax: [phone]    ____________________________________________    CC: Skin Check (Patient in clinic for FBSE. No area of concern.  )    HPI:  Mr. Deacon Torres is a(n) 62 year old male who presents today as a return patient for a skin check.     Last seen 12/7/21 for a skin check. At that time, 1 AK was treated with cryotherapy and patient declined ketoconazole for facial rash.     Today, he has no areas of concern.     Patient is otherwise feeling well, without additional skin concerns.    Labs Reviewed:  N/A    Physical Exam:  Vitals: There were no vitals taken for this visit.  SKIN: Total skin excluding the undergarment areas was performed. The exam included the head/face, neck, both arms, chest, back, abdomen, both legs, digits and/or nails.   - Well healed scars at sites of previous DN, no repigmentation or nodularity noted.   - There are waxy stuck on tan to brown papules on the trunk and extremities.    - Scattered brown macules on sun exposed areas.  - 5 mm red papule on the right upper paraspinal back.  - There are erythematous macules with overyling adherent scale on the right cheek x 1.      - No other lesions of concern on areas examined.     Medications:  Current Outpatient Medications   Medication     clonazePAM (KLONOPIN) 0.5 MG tablet     mirtazapine (REMERON) 15 MG tablet     sertraline (ZOLOFT) 50 MG tablet     No current facility-administered medications for this visit.      Past Medical History:   Patient Active Problem List   Diagnosis     Otalgia, right     History of dysplastic nevus     Dermatitis, seborrheic     Actinic keratosis     Past Medical History:   Diagnosis Date     Actinic keratosis      Carpal tunnel syndrome      Depression      Depressive disorder      Gastroesophageal reflux disease      Migraines      Tinnitus         CC No referring provider defined for this encounter. on close of this encounter.      Again, thank you for allowing me to participate in the care of your patient.        Sincerely,        Barbara Vogt MD

## 2023-04-06 NOTE — PATIENT INSTRUCTIONS
Wound Care After a Biopsy    What is a skin biopsy?  A skin biopsy allows the doctor to examine a very small piece of tissue under the microscope to determine the diagnosis and the best treatment for the skin condition. A local anesthetic (numbing medicine)  is injected with a very small needle into the skin area to be tested. A small piece of skin is taken from the area. Sometimes a suture (stitch) is used.     What are the risks of a skin biopsy?  I will experience scar, bleeding, swelling, pain, crusting and redness. I may experience incomplete removal or recurrence. Risks of this procedure are excessive bleeding, bruising, infection, nerve damage, numbness, thick (hypertrophic or keloidal) scar and non-diagnostic biopsy.    How should I care for my wound for the first 24 hours?  Keep the wound dry and covered for 24 hours  If it bleeds, hold direct pressure on the area for 15 minutes. If bleeding does not stop then go to the emergency room  Avoid strenuous exercise the first 1-2 days or as your doctor instructs you    How should I care for the wound after 24 hours?  After 24 hours, remove the bandage  You may bathe or shower as normal  If you had a scalp biopsy, you can shampoo as usual and can use shower water to clean the biopsy site daily  Clean the wound twice a day with gentle soap and water  Do not scrub, be gentle  Apply white petroleum/Vaseline after cleaning the wound with a cotton swab or a clean finger, and keep the site covered with a Bandaid /bandage. Bandages are not necessary with a scalp biopsy  If you are unable to cover the site with a Bandaid /bandage, re-apply ointment 2-3 times a day to keep the site moist. Moisture will help with healing  Avoid strenuous activity for first 1-2 days  Avoid lakes, rivers, pools, and oceans until the stitches are removed or the site is healed    How do I clean my wound?  Wash hands thoroughly with soap or use hand  before all wound care  Clean the  wound with gentle soap and water  Apply white petroleum/Vaseline  to wound after it is clean  Replace the Bandaid /bandage to keep the wound covered for the first few days or as instructed by your doctor  If you had a scalp biopsy, warm shower water to the area on a daily basis should suffice    What should I use to clean my wound?   Cotton-tipped applicators (Qtips )  White petroleum jelly (Vaseline ). Use a clean new container and use Q-tips to apply.  Bandaids   as needed  Gentle soap     How should I care for my wound long term?  Do not get your wound dirty  Keep up with wound care for one week or until the area is healed.  A small scab will form and fall off by itself when the area is completely healed. The area will be red and will become pink in color as it heals. Sun protection is very important for how your scar will turn out. Sunscreen with an SPF 30 or greater is recommended once the area is healed.  You should have some soreness but it should be mild and slowly go away over several days. Talk to your doctor about using tylenol for pain,    When should I call my doctor?  If you have increased:   Pain or swelling  Pus or drainage (clear or slightly yellow drainage is ok)  Temperature over 100F  Spreading redness or warmth around wound    When will I hear about my results?  The biopsy results can take 2 weeks to come back.  Your results will automatically release to [employer] before your provider has even reviewed them.  The clinic will call you with the results, send you a [facility] message, or have you schedule a follow-up clinic or phone time to discuss the results.  Contact our clinics if you do not hear from us in 2 weeks.    Who should I call with questions?  [facility]: [phone]  [facility]: [phone]  For urgent needs outside of business hours call the [facility] at [phone] and ask for the dermatology resident on call          Patient Education     Checking for Skin Cancer  You can find cancer early by checking your skin each month. There are 3 kinds of skin cancer. They are melanoma, basal cell carcinoma, and squamous cell carcinoma. Doing monthly skin checks is the best way to find new marks or skin changes. Follow the instructions below for checking your skin.   The ABCDEs of checking moles for melanoma   Check your moles or growths for signs of melanoma using ABCDE:   Asymmetry: the sides of the mole or growth don t match  Border: the edges are ragged, notched, or blurred  Color: the color within the mole or growth varies  Diameter: the mole or growth is larger than 6 mm (size of a pencil eraser)  Evolving: the size, shape, or color of the mole or growth is changing (evolving is not shown in the images below)    Checking for other types of skin cancer  Basal cell carcinoma or squamous cell carcinoma have symptoms such as:     A spot or mole that looks different from all other marks on your skin  Changes in how an area feels, such as itching, tenderness, or pain  Changes in the skin's surface, such as oozing, bleeding, or scaliness  A sore that does not heal  New swelling or redness beyond the border of a mole    Who s at risk?  Anyone can get skin cancer. But you are at greater risk if you have:   Fair skin, light-colored hair, or light-colored eyes  Many moles or abnormal moles on your skin  A history of sunburns from sunlight or tanning beds  A family history of skin cancer  A history of exposure to radiation or chemicals  A weakened immune system  If you have had skin cancer in the past, you are at risk for recurring skin cancer.   How to check your skin  Do your monthly skin checkups in front of a full-length mirror. Check all parts of your body, including your:   Head (ears, face, neck, and scalp)  Torso (front, back, and sides)  Arms (tops, undersides, upper, and lower armpits)  Hands (palms, backs, and fingers,  including under the nails)  Buttocks and genitals  Legs (front, back, and sides)  Feet (tops, soles, toes, including under the nails, and between toes)  If you have a lot of moles, take digital photos of them each month. Make sure to take photos both up close and from a distance. These can help you see if any moles change over time.   Most skin changes are not cancer. But if you see any changes in your skin, call your doctor right away. Only he or she can diagnose a problem. If you have skin cancer, seeing your doctor can be the first step toward getting the treatment that could save your life.   [facility] last reviewed this educational content on 4/1/2019 2000-2020 [employer]. [address] [place] [address], [place] [address]. All rights reserved. This information is not intended as a substitute for professional medical care. Always follow your healthcare professional's instructions.       When should I call my doctor?  If you are worsening or not improving, please, contact us or seek urgent care as noted below.     Who should I call with questions (adults)?  [facility] (adult and pediatric): [phone]  [facility] (adult): [phone]  For urgent needs outside of business hours call the [facility] at [phone] and ask for the dermatology resident on call to be paged  If this is a medical emergency and you are unable to reach an ER, Call [phone]    Who should I call with questions (pediatric)?  [facility]- Pediatric Dermatology  Dr. Denisa Garcia, Dr. Debby Wiley, Dr. Shantal Heaton, SANDY Long, Dr. Laly Norton, Dr. Rosie Manzo & Dr. Porter Díaz  Non-urgent nurse triage line; [phone]- Rebecca and Jayshree DICKEY Care Coordinatoralexandria Sommers (/Complex ) [phone]    If you need a prescription refill, please contact your pharmacy. Refills are approved or denied by  our Physicians during normal business hours, Monday through Fridays  Per office policy, refills will not be granted if you have not been seen within the past year (or sooner depending on your child's condition)    Scheduling Information:  Pediatric Appointment Scheduling and Call Center [phone]  Radiology Scheduling- [phone]  Sedation Unit Scheduling- [phone]  [place] Scheduling- General [phone]; Pediatric Dermatology [phone]  Main  Services: [phone]  Sierra Leonean: [phone]  Pakistani: [phone]  Hmong/Upper sorbian/Mohawk: [phone]  Preadmission Nursing Department Fax Number: [phone] (Fax all pre-operative paperwork to this number)    For urgent matters arising during evenings, weekends, or holidays that cannot wait for normal business hours please call [phone] and ask for the dermatology resident on call to be paged.

## 2023-04-10 LAB
PATH REPORT.COMMENTS IMP SPEC: NORMAL
PATH REPORT.COMMENTS IMP SPEC: NORMAL
PATH REPORT.FINAL DX SPEC: NORMAL
PATH REPORT.GROSS SPEC: NORMAL
PATH REPORT.MICROSCOPIC SPEC OTHER STN: NORMAL
PATH REPORT.RELEVANT HX SPEC: NORMAL

## 2023-04-20 ENCOUNTER — TELEPHONE (OUTPATIENT)
Dept: DERMATOLOGY | Facility: CLINIC | Age: 63
End: 2023-04-20
Payer: COMMERCIAL

## 2023-04-20 NOTE — TELEPHONE ENCOUNTER
"Case Report   Surgical Pathology Report                         Case: [id number]                                   Authorizing Provider:  Barbara Vogt MD           Collected:           04/06/2023 01:10 PM           Ordering Location:     [facility]   Received:            04/06/2023 04:46 PM                                  [place]                                                                   Pathologist:           Lamont Bailey MD                                                          Specimen:    Skin, Right upper paraspinal back                                                          Final Diagnosis   A. Right upper paraspinal back:  - Benign lichenoid keratosis - (see description)    Electronically signed by Lamont Bailey MD on 4/10/2023 at  3:20 PM   Clinical Information  [facility]   The patient is a 62 year old male.      Gross Description  [facility]   A(1). Skin, Right upper paraspinal back:  The specimen is received in formalin with proper patient identification, labeled \"right upper paraspinal back\".  The specimen consists of a 0.4 x 0.4 cm skin shave specimen containing a tan-white, flaky skin surface.  The resection margin is inked blue and it is bisected and submitted in A1.     Microscopic Description  [facility]   The specimen exhibits compact orthokeratosis, mild epidermal hyperplasia with basal layer vacuolization, scattered necrotic keratinocytes and a patchy lichenoid lymphocytic infiltrate.  There is no evidence of malignancy.   Performing Labs  [facility]   The technical component of this testing was completed at [facility]              Specimen Collected: 04/06/23  1:10 PM Last Resulted: 04/10/23  3:20 PM                Marquita Cunningham CMA   4/20/2023  2:21 PM CDT Back to Top      Spoke with patient.  He has been notified of benign pathology results.    Jia Saravia RN "   4/18/2023 12:25 PM CDT       Left VM for patient to call the clinic to review path results. Advised that results are also available for review via [employer].     Barbara Vogt MD   4/18/2023 11:38 AM CDT       Dear Deacon Torres,     We are writing to inform you of your test results that show harmless keratosis.      Thank you for taking the time to be seen in our dermatology clinic. If you have further questions or concerns, please contact the clinic(see phone number listed below).        Sincerely,     Barbara Vogt MD    Department of Dermatology  [facility]: Phone: [phone], Fax:[phone]  [facility]: Phone: [phone], Fax: [phone]

## 2023-07-15 ENCOUNTER — HEALTH MAINTENANCE LETTER (OUTPATIENT)
Age: 63
End: 2023-07-15

## 2024-09-07 ENCOUNTER — HEALTH MAINTENANCE LETTER (OUTPATIENT)
Age: 64
End: 2024-09-07

## 2024-09-30 ENCOUNTER — TRANSFERRED RECORDS (OUTPATIENT)
Dept: HEALTH INFORMATION MANAGEMENT | Facility: CLINIC | Age: 64
End: 2024-09-30

## 2025-01-20 LAB
HBA1C MFR BLD: 6 % (ref 4.8–5.6)
TSH SERPL-ACNC: 3.43 UIU/ML (ref 0.45–4.5)

## 2025-07-10 ENCOUNTER — TRANSFERRED RECORDS (OUTPATIENT)
Dept: HEALTH INFORMATION MANAGEMENT | Facility: CLINIC | Age: 65
End: 2025-07-10
Payer: COMMERCIAL

## 2025-07-16 ENCOUNTER — TRANSFERRED RECORDS (OUTPATIENT)
Dept: HEALTH INFORMATION MANAGEMENT | Facility: CLINIC | Age: 65
End: 2025-07-16
Payer: COMMERCIAL

## 2025-07-17 ENCOUNTER — TRANSFERRED RECORDS (OUTPATIENT)
Dept: HEALTH INFORMATION MANAGEMENT | Facility: CLINIC | Age: 65
End: 2025-07-17
Payer: COMMERCIAL

## 2025-07-22 ENCOUNTER — TRANSCRIBE ORDERS (OUTPATIENT)
Dept: OTHER | Age: 65
End: 2025-07-22

## 2025-07-22 ENCOUNTER — MEDICAL CORRESPONDENCE (OUTPATIENT)
Dept: HEALTH INFORMATION MANAGEMENT | Facility: CLINIC | Age: 65
End: 2025-07-22
Payer: COMMERCIAL

## 2025-07-22 ENCOUNTER — TELEPHONE (OUTPATIENT)
Dept: GASTROENTEROLOGY | Facility: CLINIC | Age: 65
End: 2025-07-22
Payer: COMMERCIAL

## 2025-07-22 DIAGNOSIS — R14.0 ABDOMINAL DISTENSION, GASEOUS: Primary | ICD-10-CM

## 2025-07-22 NOTE — TELEPHONE ENCOUNTER
"[facility] Call Center    Phone Message    May a detailed message be left on voicemail: no     Reason for Call: Received online appointment request for \"GI issues. Gas, bloating, loose stools\". Writer spoke with pt, his provider is sending in referral.  "

## 2025-07-23 ENCOUNTER — DOCUMENTATION ONLY (OUTPATIENT)
Dept: GASTROENTEROLOGY | Facility: CLINIC | Age: 65
End: 2025-07-23
Payer: COMMERCIAL

## 2025-07-23 PROBLEM — D12.6 ADENOMATOUS POLYP OF COLON: Status: ACTIVE | Noted: 2025-07-23

## 2025-07-23 NOTE — PROGRESS NOTES
Records Requested  07/23/25    Facility  [facility]  Fax: [phone]   Outcome Request sent to [facility] for records to be faxed to [phone].    7[facility]23 [facility]    [facility]   Pancho Garcia [place], MPLW  Phone: [phone]  Fax: [phone]